# Patient Record
Sex: FEMALE | Race: BLACK OR AFRICAN AMERICAN | NOT HISPANIC OR LATINO | Employment: FULL TIME | URBAN - NONMETROPOLITAN AREA
[De-identification: names, ages, dates, MRNs, and addresses within clinical notes are randomized per-mention and may not be internally consistent; named-entity substitution may affect disease eponyms.]

---

## 2018-03-27 ENCOUNTER — INITIAL PRENATAL (OUTPATIENT)
Dept: OBSTETRICS AND GYNECOLOGY | Facility: CLINIC | Age: 33
End: 2018-03-27

## 2018-03-27 ENCOUNTER — APPOINTMENT (OUTPATIENT)
Dept: LAB | Facility: HOSPITAL | Age: 33
End: 2018-03-27

## 2018-03-27 VITALS
SYSTOLIC BLOOD PRESSURE: 119 MMHG | DIASTOLIC BLOOD PRESSURE: 68 MMHG | BODY MASS INDEX: 33.68 KG/M2 | WEIGHT: 183 LBS | HEIGHT: 62 IN

## 2018-03-27 DIAGNOSIS — O99.210 OBESITY IN PREGNANCY: ICD-10-CM

## 2018-03-27 DIAGNOSIS — O34.211 MATERNAL CARE DUE TO LOW TRANSVERSE UTERINE SCAR FROM PREVIOUS CESAREAN DELIVERY: ICD-10-CM

## 2018-03-27 DIAGNOSIS — O99.011 ANEMIA DURING PREGNANCY IN FIRST TRIMESTER: ICD-10-CM

## 2018-03-27 DIAGNOSIS — Z32.01 PREGNANCY EXAMINATION OR TEST, POSITIVE RESULT: ICD-10-CM

## 2018-03-27 DIAGNOSIS — Z3A.09 9 WEEKS GESTATION OF PREGNANCY: Primary | ICD-10-CM

## 2018-03-27 PROBLEM — E66.9 OBESITY (BMI 30.0-34.9): Chronic | Status: ACTIVE | Noted: 2018-03-27

## 2018-03-27 LAB
ABO GROUP BLD: NORMAL
AMPHET+METHAMPHET UR QL: NEGATIVE
BARBITURATES UR QL SCN: NEGATIVE
BASOPHILS # BLD AUTO: 0.03 10*3/MM3 (ref 0–0.2)
BASOPHILS NFR BLD AUTO: 0.3 % (ref 0–2)
BENZODIAZ UR QL SCN: NEGATIVE
BILIRUB UR QL STRIP: NEGATIVE
BLD GP AB SCN SERPL QL: NEGATIVE
CANNABINOIDS SERPL QL: NEGATIVE
CLARITY UR: CLEAR
COCAINE UR QL: NEGATIVE
COLOR UR: YELLOW
DEPRECATED RDW RBC AUTO: 41.8 FL (ref 36.4–46.3)
EOSINOPHIL # BLD AUTO: 0.1 10*3/MM3 (ref 0–0.7)
EOSINOPHIL NFR BLD AUTO: 1.1 % (ref 0–7)
ERYTHROCYTE [DISTWIDTH] IN BLOOD BY AUTOMATED COUNT: 15.8 % (ref 11.5–14.5)
GLUCOSE UR STRIP-MCNC: NEGATIVE MG/DL
HCT VFR BLD AUTO: 31.9 % (ref 35–45)
HGB BLD-MCNC: 10.3 G/DL (ref 12–15.5)
HGB UR QL STRIP.AUTO: NEGATIVE
IMM GRANULOCYTES # BLD: 0.02 10*3/MM3 (ref 0–0.02)
IMM GRANULOCYTES NFR BLD: 0.2 % (ref 0–0.5)
KETONES UR QL STRIP: NEGATIVE
LEUKOCYTE ESTERASE UR QL STRIP.AUTO: NEGATIVE
LYMPHOCYTES # BLD AUTO: 2.82 10*3/MM3 (ref 0.6–4.2)
LYMPHOCYTES NFR BLD AUTO: 30.4 % (ref 10–50)
Lab: NORMAL
MCH RBC QN AUTO: 23.4 PG (ref 26.5–34)
MCHC RBC AUTO-ENTMCNC: 32.3 G/DL (ref 31.4–36)
MCV RBC AUTO: 72.3 FL (ref 80–98)
METHADONE UR QL SCN: NEGATIVE
MONOCYTES # BLD AUTO: 0.55 10*3/MM3 (ref 0–0.9)
MONOCYTES NFR BLD AUTO: 5.9 % (ref 0–12)
NEUTROPHILS # BLD AUTO: 5.77 10*3/MM3 (ref 2–8.6)
NEUTROPHILS NFR BLD AUTO: 62.1 % (ref 37–80)
NITRITE UR QL STRIP: NEGATIVE
NRBC BLD MANUAL-RTO: 0.3 /100 WBC (ref 0–0)
OPIATES UR QL: NEGATIVE
OXYCODONE UR QL SCN: NEGATIVE
PH UR STRIP.AUTO: 7 [PH] (ref 5–9)
PLATELET # BLD AUTO: 391 10*3/MM3 (ref 150–450)
PMV BLD AUTO: 11 FL (ref 8–12)
PROT UR QL STRIP: NEGATIVE
RBC # BLD AUTO: 4.41 10*6/MM3 (ref 3.77–5.16)
RH BLD: POSITIVE
SP GR UR STRIP: 1.02 (ref 1–1.03)
UROBILINOGEN UR QL STRIP: NORMAL
WBC NRBC COR # BLD: 9.29 10*3/MM3 (ref 3.2–9.8)

## 2018-03-27 PROCEDURE — 87086 URINE CULTURE/COLONY COUNT: CPT | Performed by: OBSTETRICS & GYNECOLOGY

## 2018-03-27 PROCEDURE — 81003 URINALYSIS AUTO W/O SCOPE: CPT | Performed by: OBSTETRICS & GYNECOLOGY

## 2018-03-27 PROCEDURE — 86901 BLOOD TYPING SEROLOGIC RH(D): CPT | Performed by: OBSTETRICS & GYNECOLOGY

## 2018-03-27 PROCEDURE — 86850 RBC ANTIBODY SCREEN: CPT | Performed by: OBSTETRICS & GYNECOLOGY

## 2018-03-27 PROCEDURE — G0432 EIA HIV-1/HIV-2 SCREEN: HCPCS | Performed by: OBSTETRICS & GYNECOLOGY

## 2018-03-27 PROCEDURE — 80307 DRUG TEST PRSMV CHEM ANLYZR: CPT | Performed by: OBSTETRICS & GYNECOLOGY

## 2018-03-27 PROCEDURE — 86762 RUBELLA ANTIBODY: CPT | Performed by: OBSTETRICS & GYNECOLOGY

## 2018-03-27 PROCEDURE — 36415 COLL VENOUS BLD VENIPUNCTURE: CPT | Performed by: OBSTETRICS & GYNECOLOGY

## 2018-03-27 PROCEDURE — 85025 COMPLETE CBC W/AUTO DIFF WBC: CPT | Performed by: OBSTETRICS & GYNECOLOGY

## 2018-03-27 PROCEDURE — 0501F PRENATAL FLOW SHEET: CPT | Performed by: OBSTETRICS & GYNECOLOGY

## 2018-03-27 PROCEDURE — 87340 HEPATITIS B SURFACE AG IA: CPT | Performed by: OBSTETRICS & GYNECOLOGY

## 2018-03-27 PROCEDURE — 86900 BLOOD TYPING SEROLOGIC ABO: CPT | Performed by: OBSTETRICS & GYNECOLOGY

## 2018-03-27 PROCEDURE — 86803 HEPATITIS C AB TEST: CPT | Performed by: OBSTETRICS & GYNECOLOGY

## 2018-03-27 RX ORDER — ONDANSETRON 8 MG/1
8 TABLET, ORALLY DISINTEGRATING ORAL DAILY
Qty: 30 TABLET | Refills: 11 | Status: SHIPPED | OUTPATIENT
Start: 2018-03-27 | End: 2018-04-30 | Stop reason: SDUPTHER

## 2018-03-27 RX ORDER — PROMETHAZINE HYDROCHLORIDE 25 MG/1
25 TABLET ORAL EVERY 6 HOURS PRN
Qty: 60 TABLET | Refills: 11 | Status: SHIPPED | OUTPATIENT
Start: 2018-03-27 | End: 2018-07-25

## 2018-03-27 RX ORDER — FOLIC ACID 1 MG/1
1 TABLET ORAL DAILY
Qty: 90 TABLET | Refills: 3 | Status: SHIPPED | OUTPATIENT
Start: 2018-03-27

## 2018-03-28 PROBLEM — O99.019 ANEMIA OF PREGNANCY: Status: ACTIVE | Noted: 2018-03-28

## 2018-03-28 LAB
BACTERIA SPEC AEROBE CULT: NORMAL
HBV SURFACE AG SERPL QL IA: NEGATIVE
HCV AB SER DONR QL: NEGATIVE
HIV1+2 AB SER QL: NEGATIVE
RUBV IGG SER QL: ABNORMAL
RUBV IGG SER-ACNC: 53.7 IU/ML (ref 0–9.9)

## 2018-03-28 RX ORDER — FERROUS SULFATE 325(65) MG
325 TABLET ORAL
Qty: 90 TABLET | Refills: 11 | Status: SHIPPED | OUTPATIENT
Start: 2018-03-28 | End: 2018-04-30 | Stop reason: SDUPTHER

## 2018-03-28 NOTE — PROGRESS NOTES
Chief Complaint   Patient presents with   • High Risk Gestation       Velia Hernández is a 32 y.o. year old .  Patient's last menstrual period was 2018.  She presents to be seen to initiate prenatal care.  She has had 3 previous  sections.  The first in  named Jevon.   named Dorothy.  2010 named Bethanie.  Dr. Haywood delivered the last baby.    2017, she is having nausea and vomiting on prenatal vitamins, and I placed her on folic acid, Phenergan, and Zofran.  Handheld ultrasound confirms single intrauterine pregnancy with fetal heart rate 170 bpm.  After she left her prenatal labs came back showing hemoglobin 10.3 and hematocrit 31.9.  We will place her on iron.    She desires to have her tubes tied.  We'll plan for repeat  section and permanent surgical sterilization.    Smoking status: Not on file                        Smokeless tobacco: Not on file                         The following portions of the patient's history were reviewed and updated as appropriate:vital signs, allergies, current medications, past family history, past medical history, past social history, past surgical history and problem list.    Lab Review   CBC    Imaging   Handheld ultrasound confirming single intrauterine pregnancy consistent with 9 week gestation with fetal heart rate 170 bpm.    Assessment/Plan   ASSESSMENT  1. IUP at 9w1d  Velia was seen today for high risk gestation.    Diagnoses and all orders for this visit:    9 weeks gestation of pregnancy    Maternal care due to low transverse uterine scar from previous  delivery    Obesity in pregnancy    Pregnancy examination or test, positive result  -     OB Panel With HIV  -     Urinalysis - Urine, Clean Catch  -     Urine Culture - Urine, Urine, Clean Catch  -     Urine Drug Screen - Urine, Clean Catch  -     US Ob Transvaginal  -     RPR  -     CBC Auto Differential  -     Hepatitis B Surface Antigen  -      Rubella Antibody, IgG  -     OB Panel Type & Screen  -     HIV-1 & HIV-2 Antibodies  -     Hepatitis C Antibody  -     PREVIOUS HISTORY; Future  -     PREVIOUS HISTORY  -     Scan Slide; Future  -     Cancel: Scan Slide    Anemia during pregnancy in first trimester    Other orders  -     folic acid (FOLVITE) 1 MG tablet; Take 1 tablet by mouth Daily.  -     promethazine (PHENERGAN) 25 MG tablet; Take 1 tablet by mouth Every 6 (Six) Hours As Needed for Nausea or Vomiting.  -     ondansetron ODT (ZOFRAN ODT) 8 MG disintegrating tablet; Take 1 tablet by mouth Daily.  -     ferrous sulfate 325 (65 FE) MG tablet; Take 1 tablet by mouth 3 (Three) Times a Day With Meals.    2.     PLAN  1. Tests ordered today:  Orders Placed This Encounter   Procedures   • Urine Culture - Urine, Urine, Clean Catch   • US Ob Transvaginal     Order Specific Question:   Reason for Exam:     Answer:   dating   • OB Panel With HIV   • Urinalysis - Urine, Clean Catch   • Urine Drug Screen - Urine, Clean Catch   • RPR   • CBC Auto Differential   • Hepatitis B Surface Antigen   • Rubella Antibody, IgG   • HIV-1 & HIV-2 Antibodies   • Hepatitis C Antibody   • Scan Slide     Standing Status:   Future     Number of Occurrences:   1     Standing Expiration Date:   3/27/2019   • OB Panel Type & Screen   • PREVIOUS HISTORY     Standing Status:   Future     Number of Occurrences:   1     Standing Expiration Date:   3/27/2019     2. Medications prescribed today:  New Medications Ordered This Visit   Medications   • folic acid (FOLVITE) 1 MG tablet     Sig: Take 1 tablet by mouth Daily.     Dispense:  90 tablet     Refill:  3   • promethazine (PHENERGAN) 25 MG tablet     Sig: Take 1 tablet by mouth Every 6 (Six) Hours As Needed for Nausea or Vomiting.     Dispense:  60 tablet     Refill:  11   • ondansetron ODT (ZOFRAN ODT) 8 MG disintegrating tablet     Sig: Take 1 tablet by mouth Daily.     Dispense:  30 tablet     Refill:  11   • ferrous sulfate 325 (65  FE) MG tablet     Sig: Take 1 tablet by mouth 3 (Three) Times a Day With Meals.     Dispense:  90 tablet     Refill:  11       Follow up: 4 week(s)       This note was electronically signed.    Aroldo Haywood MD  March 28, 2018

## 2018-03-30 LAB — RPR SER QL: NORMAL

## 2018-04-12 ENCOUNTER — TELEPHONE (OUTPATIENT)
Dept: OBSTETRICS AND GYNECOLOGY | Facility: CLINIC | Age: 33
End: 2018-04-12

## 2018-04-12 NOTE — TELEPHONE ENCOUNTER
----- Message from Danica Holliday sent at 4/12/2018 11:47 AM CDT -----  Contact: [phone]  Pt was seen in [facility] last night for bleeding, wants to follow up with Dr. Haywood next week in [place] in the morning. She said the heart rate was fine and she is no longer bleeding. She was seen on 03/27/2018 for a new ob with Dr. Haywood. Please call patient, thanks!

## 2018-04-30 ENCOUNTER — ROUTINE PRENATAL (OUTPATIENT)
Dept: OBSTETRICS AND GYNECOLOGY | Facility: CLINIC | Age: 33
End: 2018-04-30

## 2018-04-30 VITALS — WEIGHT: 180 LBS | SYSTOLIC BLOOD PRESSURE: 111 MMHG | DIASTOLIC BLOOD PRESSURE: 72 MMHG | BODY MASS INDEX: 32.92 KG/M2

## 2018-04-30 DIAGNOSIS — O99.011 ANEMIA DURING PREGNANCY IN FIRST TRIMESTER: ICD-10-CM

## 2018-04-30 DIAGNOSIS — Z3A.13 13 WEEKS GESTATION OF PREGNANCY: ICD-10-CM

## 2018-04-30 DIAGNOSIS — O99.611 CONSTIPATION DURING PREGNANCY IN FIRST TRIMESTER: ICD-10-CM

## 2018-04-30 DIAGNOSIS — O34.211 ENCOUNTER FOR MATERNAL CARE FOR LOW TRANSVERSE SCAR FROM PREVIOUS CESAREAN DELIVERY: Primary | ICD-10-CM

## 2018-04-30 DIAGNOSIS — Z36.3 ENCOUNTER FOR ANTENATAL SCREENING FOR MALFORMATION USING ULTRASOUND: ICD-10-CM

## 2018-04-30 DIAGNOSIS — K59.00 CONSTIPATION DURING PREGNANCY IN FIRST TRIMESTER: ICD-10-CM

## 2018-04-30 DIAGNOSIS — O99.210 OBESITY IN PREGNANCY: ICD-10-CM

## 2018-04-30 DIAGNOSIS — O21.9 NAUSEA AND VOMITING DURING PREGNANCY PRIOR TO 22 WEEKS GESTATION: ICD-10-CM

## 2018-04-30 PROCEDURE — 0502F SUBSEQUENT PRENATAL CARE: CPT | Performed by: NURSE PRACTITIONER

## 2018-04-30 RX ORDER — ONDANSETRON 8 MG/1
8 TABLET, ORALLY DISINTEGRATING ORAL DAILY
Qty: 30 TABLET | Refills: 11 | Status: SHIPPED | OUTPATIENT
Start: 2018-04-30 | End: 2018-07-25

## 2018-04-30 RX ORDER — FERROUS SULFATE 325(65) MG
325 TABLET ORAL
Qty: 90 TABLET | Refills: 11 | Status: SHIPPED | OUTPATIENT
Start: 2018-04-30 | End: 2018-08-08

## 2018-04-30 RX ORDER — RANITIDINE 150 MG/1
150 TABLET ORAL NIGHTLY
Qty: 30 TABLET | Refills: 6 | Status: SHIPPED | OUTPATIENT
Start: 2018-04-30 | End: 2018-07-25

## 2018-04-30 RX ORDER — DOCUSATE SODIUM 100 MG/1
100 CAPSULE, LIQUID FILLED ORAL NIGHTLY
Qty: 30 CAPSULE | Refills: 12 | Status: SHIPPED | OUTPATIENT
Start: 2018-04-30 | End: 2019-04-30

## 2018-04-30 NOTE — PROGRESS NOTES
Velia Hernández is a 32 y.o. year old .  Patient's last menstrual period was 2018.  She presents to be seen to initiate prenatal care.  She has had 3 previous  sections.  The first in  named Jevon.   named Dorothy.  2010 named Bethanie.  Dr. Haywood delivered the last baby.     2017, she is having nausea and vomiting on prenatal vitamins, and I placed her on folic acid, Phenergan, and Zofran.  Handheld ultrasound confirms single intrauterine pregnancy with fetal heart rate 170 bpm.  After she left her prenatal labs came back showing hemoglobin 10.3 and hematocrit 31.9.  We will place her on iron.    2017, she continues to have nausea and vomiting and is requesting intermittent FMLA because she is getting points at work for having to leave 2/2 sickness. Taking phenergan at night but nothing during the daytime. Will start on zofran during the day and she will bring back her FMLA papers. Having constipation with the iron supplement and c/o a metallic taste in her mouth, especially first thing in the morning. Will send colace HS and zantac HS. Vscan showed + fetal heart rate and fetal movement. Anatomy scan ordered and scheduled for 6 weeks, LICO visit in 4 weeks.     She desires to have her tubes tied.  We'll plan for repeat  section and permanent surgical sterilization.     Smoking status: Not on file                        Smokeless tobacco: Not on file                           The following portions of the patient's history were reviewed and updated as appropriate:vital signs, allergies, current medications, past family history, past medical history, past social history, past surgical history and problem list.    Velia was seen today for routine prenatal visit.    Diagnoses and all orders for this visit:    Encounter for maternal care for low transverse scar from previous  delivery  -     US Ob 14 + Weeks Single or First Gestation;  Future    Encounter for  screening for malformation using ultrasound  -     US Ob 14 + Weeks Single or First Gestation; Future    Nausea and vomiting during pregnancy prior to 22 weeks gestation  -     US Ob 14 + Weeks Single or First Gestation; Future    13 weeks gestation of pregnancy    Constipation during pregnancy in first trimester    Anemia during pregnancy in first trimester    Obesity in pregnancy    Other orders  -     ondansetron ODT (ZOFRAN ODT) 8 MG disintegrating tablet; Take 1 tablet by mouth Daily.  -     ferrous sulfate 325 (65 FE) MG tablet; Take 1 tablet by mouth 3 (Three) Times a Day With Meals.  -     docusate sodium (COLACE) 100 MG capsule; Take 1 capsule by mouth Every Night.  -     raNITIdine (ZANTAC) 150 MG tablet; Take 1 tablet by mouth Every Night.

## 2018-05-14 ENCOUNTER — ROUTINE PRENATAL (OUTPATIENT)
Dept: OBSTETRICS AND GYNECOLOGY | Facility: CLINIC | Age: 33
End: 2018-05-14

## 2018-05-14 VITALS — BODY MASS INDEX: 33.29 KG/M2 | SYSTOLIC BLOOD PRESSURE: 122 MMHG | WEIGHT: 182 LBS | DIASTOLIC BLOOD PRESSURE: 73 MMHG

## 2018-05-14 DIAGNOSIS — O34.211 MATERNAL CARE DUE TO LOW TRANSVERSE UTERINE SCAR FROM PREVIOUS CESAREAN DELIVERY: ICD-10-CM

## 2018-05-14 DIAGNOSIS — Z3A.19 19 WEEKS GESTATION OF PREGNANCY: ICD-10-CM

## 2018-05-14 DIAGNOSIS — O99.012 ANEMIA DURING PREGNANCY IN SECOND TRIMESTER: ICD-10-CM

## 2018-05-14 DIAGNOSIS — O99.210 OBESITY IN PREGNANCY: ICD-10-CM

## 2018-05-14 DIAGNOSIS — O21.9 NAUSEA AND VOMITING DURING PREGNANCY PRIOR TO 22 WEEKS GESTATION: Primary | ICD-10-CM

## 2018-05-14 PROBLEM — O99.612 CONSTIPATION DURING PREGNANCY IN SECOND TRIMESTER: Status: ACTIVE | Noted: 2018-04-30

## 2018-05-14 PROCEDURE — 0502F SUBSEQUENT PRENATAL CARE: CPT | Performed by: NURSE PRACTITIONER

## 2018-05-14 NOTE — PROGRESS NOTES
Velia Hernández is a 32 y.o. year old .  Patient's last menstrual period was 2018.  She presents to be seen to initiate prenatal care.  She has had 3 previous  sections.  The first in  named Jevon.   named Dorothy.  2010 named Bethanie.  Dr. Haywood delivered the last baby.     2017, she is having nausea and vomiting on prenatal vitamins, and I placed her on folic acid, Phenergan, and Zofran.  Handheld ultrasound confirms single intrauterine pregnancy with fetal heart rate 170 bpm.  After she left her prenatal labs came back showing hemoglobin 10.3 and hematocrit 31.9.  We will place her on iron.     2017, she continues to have nausea and vomiting and is requesting intermittent FMLA because she is getting points at work for having to leave 2/2 sickness. Taking phenergan at night but nothing during the daytime. Will start on zofran during the day and she will bring back her FMLA papers. Having constipation with the iron supplement and c/o a metallic taste in her mouth, especially first thing in the morning. Will send colace HS and zantac HS. Vscan showed + fetal heart rate and fetal movement. Anatomy scan ordered and scheduled for 6 weeks, LICO visit in 4 weeks.     She desires to have her tubes tied.  We'll plan for repeat  section and permanent surgical sterilization.    May 14, 2018 Here for check-up. States that she was kneed in the belly 8 days ago and has not been seen to make sure the baby is ok. Had cramping last Monday but no bleeding or cramping since then. Morning sickness has improved greatly since starting on Zofran. States that she is feeling daily movements up by her belly button, which is why she got worried after being kneed, because she thinks the baby is higher than where it should be for 15 weeks and thinks that the baby was actually hit. Vscan showed an active fetus that appears to be around 20 weeks in size. She reports that she  "had a \"normal period\" on 18, then had 2 days of light spotting on 18. Changed active LMP to 18 which makes JEVON 10/9/18 and she is 19w0d. Will confirm with anatomy scan this Friday.    Chief Complaint   Patient presents with   • Other     Hit in belly last ; hasn't been seen       The patient complains of the following: No complaints    ROS  Vaginal bleeding: No   Nausea: improved as compared to the prior visit   Diarrhea: No   Constipation: improved as compared to the prior visit   Other:      Lab Results   Component Value Date    ABO B 2018    RH Positive 2018    ABSCRN Negative 2018       Specific topics discussed at today's visit:none - she had no major complaints,questions or concerns  Tests done today: none  Tests to be done at the next visit: U/S for anatomic screening    Velia was seen today for other.    Diagnoses and all orders for this visit:    Nausea and vomiting during pregnancy prior to 22 weeks gestation    Anemia during pregnancy in second trimester    Maternal care due to low transverse uterine scar from previous  delivery    Obesity in pregnancy    19 weeks gestation of pregnancy            "

## 2018-05-18 ENCOUNTER — ROUTINE PRENATAL (OUTPATIENT)
Dept: OBSTETRICS AND GYNECOLOGY | Facility: CLINIC | Age: 33
End: 2018-05-18

## 2018-05-18 VITALS — WEIGHT: 181 LBS | DIASTOLIC BLOOD PRESSURE: 67 MMHG | BODY MASS INDEX: 33.11 KG/M2 | SYSTOLIC BLOOD PRESSURE: 114 MMHG

## 2018-05-18 DIAGNOSIS — Z36.3 ENCOUNTER FOR ANTENATAL SCREENING FOR MALFORMATION USING ULTRASOUND: ICD-10-CM

## 2018-05-18 DIAGNOSIS — O21.9 NAUSEA AND VOMITING DURING PREGNANCY PRIOR TO 22 WEEKS GESTATION: ICD-10-CM

## 2018-05-18 DIAGNOSIS — Z3A.17 17 WEEKS GESTATION OF PREGNANCY: ICD-10-CM

## 2018-05-18 DIAGNOSIS — IMO0002 EVALUATE ANATOMY NOT SEEN ON PRIOR SONOGRAM: ICD-10-CM

## 2018-05-18 DIAGNOSIS — Z36.89 ENCOUNTER FOR OTHER SPECIFIED ANTENATAL SCREENING: Primary | ICD-10-CM

## 2018-05-18 DIAGNOSIS — O99.210 OBESITY IN PREGNANCY: ICD-10-CM

## 2018-05-18 DIAGNOSIS — O34.211 MATERNAL CARE DUE TO LOW TRANSVERSE UTERINE SCAR FROM PREVIOUS CESAREAN DELIVERY: ICD-10-CM

## 2018-05-18 PROCEDURE — 0502F SUBSEQUENT PRENATAL CARE: CPT | Performed by: NURSE PRACTITIONER

## 2018-05-18 NOTE — PROGRESS NOTES
Velia Hernández is a 32 y.o. year old .  Patient's last menstrual period was 2018.  She presents to be seen to initiate prenatal care.  She has had 3 previous  sections.  The first in  named Jevon.   named Dorothy.  2010 named Bethanie.  Dr. Haywood delivered the last baby.     2017, she is having nausea and vomiting on prenatal vitamins, and I placed her on folic acid, Phenergan, and Zofran.  Handheld ultrasound confirms single intrauterine pregnancy with fetal heart rate 170 bpm.  After she left her prenatal labs came back showing hemoglobin 10.3 and hematocrit 31.9.  We will place her on iron.     2017, she continues to have nausea and vomiting and is requesting intermittent FMLA because she is getting points at work for having to leave 2/2 sickness. Taking phenergan at night but nothing during the daytime. Will start on zofran during the day and she will bring back her FMLA papers. Having constipation with the iron supplement and c/o a metallic taste in her mouth, especially first thing in the morning. Will send colace HS and zantac HS. Vscan showed + fetal heart rate and fetal movement. Anatomy scan ordered and scheduled for 6 weeks, LICO visit in 4 weeks.     She desires to have her tubes tied.  We'll plan for repeat  section and permanent surgical sterilization.     May 14, 2018 Here for check-up. States that she was kneed in the belly 8 days ago and has not been seen to make sure the baby is ok. Had cramping last Monday but no bleeding or cramping since then. Morning sickness has improved greatly since starting on Zofran. States that she is feeling daily movements up by her belly button, which is why she got worried after being kneed, because she thinks the baby is higher than where it should be for 15 weeks and thinks that the baby was actually hit. Vscan showed an active fetus that appears to be around 20 weeks in size. She reports that she  "had a \"normal period\" on 1/1/18, then had 2 days of light spotting on 1/23/18. Changed active LMP to 1/1/18 which makes JEVON 10/9/18 and she is 19w0d. Will confirm with anatomy scan this Friday.    5/18/18- Here for anatomy u/s and to confirm dates. She is measuring 21cm and feeling fetal movement at the umbilicus. Reports that her morning sickness is well controlled at this time and she denies dysuria, vb, LOF, headaches or heartburn. Anatomy u/s has baby measuring 17w0d with EFW of 0lb 6oz. LOIS- 13.0cm, CL- 3.58cm and posterior placenta with normal insertion of a 3 vessel cord. All fetal structures seen and noted to appear normal, except for the RVOT and LVOT; both were not adequately seen today. Recommended f/u scan in 5 weeks to complete anatomy. Baby boy, not sure of name yet.  "

## 2018-06-22 ENCOUNTER — ROUTINE PRENATAL (OUTPATIENT)
Dept: OBSTETRICS AND GYNECOLOGY | Facility: CLINIC | Age: 33
End: 2018-06-22

## 2018-06-22 VITALS — WEIGHT: 179 LBS | SYSTOLIC BLOOD PRESSURE: 114 MMHG | BODY MASS INDEX: 32.74 KG/M2 | DIASTOLIC BLOOD PRESSURE: 73 MMHG

## 2018-06-22 DIAGNOSIS — O99.012 ANEMIA DURING PREGNANCY IN SECOND TRIMESTER: Primary | ICD-10-CM

## 2018-06-22 DIAGNOSIS — O34.211 MATERNAL CARE DUE TO LOW TRANSVERSE UTERINE SCAR FROM PREVIOUS CESAREAN DELIVERY: ICD-10-CM

## 2018-06-22 DIAGNOSIS — Z3A.22 22 WEEKS GESTATION OF PREGNANCY: ICD-10-CM

## 2018-06-22 DIAGNOSIS — O99.210 OBESITY IN PREGNANCY: ICD-10-CM

## 2018-06-22 DIAGNOSIS — Z36.89 ENCOUNTER FOR OTHER SPECIFIED ANTENATAL SCREENING: ICD-10-CM

## 2018-06-22 PROCEDURE — 0502F SUBSEQUENT PRENATAL CARE: CPT | Performed by: NURSE PRACTITIONER

## 2018-06-22 NOTE — PROGRESS NOTES
Velia Hernández is a 32 y.o. year old .  Patient's last menstrual period was 2018.  She presents to be seen to initiate prenatal care.  She has had 3 previous  sections.  The first in  named Jevon.   named Dorothy.  2010 named Bethanie.  Dr. Haywood delivered the last baby.     2017, she is having nausea and vomiting on prenatal vitamins, and I placed her on folic acid, Phenergan, and Zofran.  Handheld ultrasound confirms single intrauterine pregnancy with fetal heart rate 170 bpm.  After she left her prenatal labs came back showing hemoglobin 10.3 and hematocrit 31.9.  We will place her on iron.     2017, she continues to have nausea and vomiting and is requesting intermittent FMLA because she is getting points at work for having to leave 2/2 sickness. Taking phenergan at night but nothing during the daytime. Will start on zofran during the day and she will bring back her FMLA papers. Having constipation with the iron supplement and c/o a metallic taste in her mouth, especially first thing in the morning. Will send colace HS and zantac HS. Vscan showed + fetal heart rate and fetal movement. Anatomy scan ordered and scheduled for 6 weeks, LICO visit in 4 weeks.     She desires to have her tubes tied.  We'll plan for repeat  section and permanent surgical sterilization.     May 14, 2018 Here for check-up. States that she was kneed in the belly 8 days ago and has not been seen to make sure the baby is ok. Had cramping last Monday but no bleeding or cramping since then. Morning sickness has improved greatly since starting on Zofran. States that she is feeling daily movements up by her belly button, which is why she got worried after being kneed, because she thinks the baby is higher than where it should be for 15 weeks and thinks that the baby was actually hit. Vscan showed an active fetus that appears to be around 20 weeks in size. She reports that she  "had a \"normal period\" on 1/1/18, then had 2 days of light spotting on 1/23/18. Changed active LMP to 1/1/18 which makes JEVON 10/9/18 and she is 19w0d. Will confirm with anatomy scan this Friday.     5/18/18- Here for anatomy u/s and to confirm dates. She is measuring 21cm and feeling fetal movement at the umbilicus. Reports that her morning sickness is well controlled at this time and she denies dysuria, vb, LOF, headaches or heartburn. Anatomy u/s has baby measuring 17w0d with EFW of 0lb 6oz. LOIS- 13.0cm, CL- 3.58cm and posterior placenta with normal insertion of a 3 vessel cord. All fetal structures seen and noted to appear normal, except for the RVOT and LVOT; both were not adequately seen today. Recommended f/u scan in 5 weeks to complete anatomy. Baby boy, not sure of name yet.    6/22/18- F/U u/s on heart views today; all WNL and no other imaging recommended on preliminary report. Feeling regular movements and denies dysuria, vb, LOF headaches or heartburn. Having cramping after long shifts but subsides with rest. Naming baby boy Perez Scott RTC in 4 weeks for 1 hour glucola and CBC.   "

## 2018-07-25 ENCOUNTER — ROUTINE PRENATAL (OUTPATIENT)
Dept: OBSTETRICS AND GYNECOLOGY | Facility: CLINIC | Age: 33
End: 2018-07-25

## 2018-07-25 VITALS — SYSTOLIC BLOOD PRESSURE: 107 MMHG | DIASTOLIC BLOOD PRESSURE: 67 MMHG | WEIGHT: 181 LBS | BODY MASS INDEX: 33.11 KG/M2

## 2018-07-25 DIAGNOSIS — Z3A.26 26 WEEKS GESTATION OF PREGNANCY: Primary | ICD-10-CM

## 2018-07-25 DIAGNOSIS — Z34.80 PRENATAL CARE OF MULTIGRAVIDA, ANTEPARTUM: ICD-10-CM

## 2018-07-25 DIAGNOSIS — O34.211 MATERNAL CARE DUE TO LOW TRANSVERSE UTERINE SCAR FROM PREVIOUS CESAREAN DELIVERY: ICD-10-CM

## 2018-07-25 DIAGNOSIS — O99.210 OBESITY IN PREGNANCY: ICD-10-CM

## 2018-07-25 DIAGNOSIS — O99.012 ANEMIA DURING PREGNANCY IN SECOND TRIMESTER: ICD-10-CM

## 2018-07-25 PROCEDURE — 82950 GLUCOSE TEST: CPT | Performed by: NURSE PRACTITIONER

## 2018-07-25 PROCEDURE — 85027 COMPLETE CBC AUTOMATED: CPT | Performed by: NURSE PRACTITIONER

## 2018-07-25 PROCEDURE — 0502F SUBSEQUENT PRENATAL CARE: CPT | Performed by: OBSTETRICS & GYNECOLOGY

## 2018-07-28 PROBLEM — O47.02 THREATENED PREMATURE LABOR IN SECOND TRIMESTER: Status: ACTIVE | Noted: 2018-07-28

## 2018-07-28 NOTE — PROGRESS NOTES
Chief Complaint   Patient presents with   • Routine Prenatal Visit   • High Risk Gestation     Velia Hernández is a 32 y.o. year old .  Patient's last menstrual period was 2018.  She presents to be seen to initiate prenatal care.  She has had 3 previous  sections.  The first in  named Jevon.   named Dorothy.  2010 named Bethanie.  Dr. Haywood delivered the last baby.     2017, she is having nausea and vomiting on prenatal vitamins, and I placed her on folic acid, Phenergan, and Zofran.  Handheld ultrasound confirms single intrauterine pregnancy with fetal heart rate 170 bpm.  After she left her prenatal labs came back showing hemoglobin 10.3 and hematocrit 31.9.  We will place her on iron.     2017, she continues to have nausea and vomiting and is requesting intermittent FMLA because she is getting points at work for having to leave 2/2 sickness. Taking phenergan at night but nothing during the daytime. Will start on zofran during the day and she will bring back her FMLA papers. Having constipation with the iron supplement and c/o a metallic taste in her mouth, especially first thing in the morning. Will send colace HS and zantac HS. Vscan showed + fetal heart rate and fetal movement. Anatomy scan ordered and scheduled for 6 weeks, LICO visit in 4 weeks.     She desires to have her tubes tied.  We'll plan for repeat  section and permanent surgical sterilization.     May 14, 2018 Here for check-up. States that she was kneed in the belly 8 days ago and has not been seen to make sure the baby is ok. Had cramping last Monday but no bleeding or cramping since then. Morning sickness has improved greatly since starting on Zofran. States that she is feeling daily movements up by her belly button, which is why she got worried after being kneed, because she thinks the baby is higher than where it should be for 15 weeks and thinks that the baby was actually  "hit. Vscan showed an active fetus that appears to be around 20 weeks in size. She reports that she had a \"normal period\" on 18, then had 2 days of light spotting on 18. Changed active LMP to 18 which makes JEVON 10/9/18 and she is 19w0d. Will confirm with anatomy scan this Friday.     18- Here for anatomy u/s and to confirm dates. She is measuring 21cm and feeling fetal movement at the umbilicus. Reports that her morning sickness is well controlled at this time and she denies dysuria, vb, LOF, headaches or heartburn. Anatomy u/s has baby measuring 17w0d with EFW of 0lb 6oz. LOIS- 13.0cm, CL- 3.58cm and posterior placenta with normal insertion of a 3 vessel cord. All fetal structures seen and noted to appear normal, except for the RVOT and LVOT; both were not adequately seen today. Recommended f/u scan in 5 weeks to complete anatomy. Baby boy, not sure of name yet.     18- F/U u/s on heart views today; all WNL and no other imaging recommended on preliminary report. Feeling regular movements and denies dysuria, vb, LOF headaches or heartburn. Having cramping after long shifts but subsides with rest.    Naming baby boy Perez Scott     CBC and one-hour Glucola today.    She has threatened  labor, and I recommend she be off work for remainder of pregnancy starting 2018.    Plan repeat low transverse  section and bilateral tubal ligation on Tuesday, 2018.  She may want Mirena IUD.    The patient complains of the following: Worsening contractions while up and about.    ROS  Vaginal bleeding: No   Nausea: No   Diarrhea: No   Constipation: No   Other:      Lab Results   Component Value Date    ABO B 2018    RH Positive 2018    ABSCRN Negative 2018       Specific topics discussed at today's visit: Fetal kick counts and  labor precautions  Tests done today: CBC and one-hour Glucola   Tests to be done at the next visit: none    Velia was seen today " for routine prenatal visit and high risk gestation.    Diagnoses and all orders for this visit:    26 weeks gestation of pregnancy    Maternal care due to low transverse uterine scar from previous  delivery    Obesity in pregnancy    Anemia during pregnancy in second trimester    Prenatal care of multigravida, antepartum

## 2018-08-01 ENCOUNTER — TELEPHONE (OUTPATIENT)
Dept: OBSTETRICS AND GYNECOLOGY | Facility: CLINIC | Age: 33
End: 2018-08-01

## 2018-08-01 DIAGNOSIS — O99.012 ANEMIA OF PREGNANCY IN SECOND TRIMESTER: Primary | ICD-10-CM

## 2018-08-01 NOTE — TELEPHONE ENCOUNTER
----- Message from JHONY Mckenna sent at 7/30/2018 10:19 AM CDT -----  Please get her set up for iron infusions at the [facility]. She's already taking FeSO4 TID. 1 hour glucola WNL.

## 2018-08-08 ENCOUNTER — ROUTINE PRENATAL (OUTPATIENT)
Dept: OBSTETRICS AND GYNECOLOGY | Facility: CLINIC | Age: 33
End: 2018-08-08

## 2018-08-08 VITALS — WEIGHT: 178 LBS | DIASTOLIC BLOOD PRESSURE: 60 MMHG | SYSTOLIC BLOOD PRESSURE: 106 MMHG | BODY MASS INDEX: 32.56 KG/M2

## 2018-08-08 DIAGNOSIS — O99.210 OBESITY IN PREGNANCY: ICD-10-CM

## 2018-08-08 DIAGNOSIS — Z3A.28 28 WEEKS GESTATION OF PREGNANCY: Primary | ICD-10-CM

## 2018-08-08 DIAGNOSIS — O99.013 ANEMIA DURING PREGNANCY IN THIRD TRIMESTER: ICD-10-CM

## 2018-08-08 DIAGNOSIS — O34.211 MATERNAL CARE DUE TO LOW TRANSVERSE UTERINE SCAR FROM PREVIOUS CESAREAN DELIVERY: ICD-10-CM

## 2018-08-08 PROCEDURE — 0502F SUBSEQUENT PRENATAL CARE: CPT | Performed by: OBSTETRICS & GYNECOLOGY

## 2018-08-08 RX ORDER — FERROUS SULFATE 325(65) MG
325 TABLET ORAL
Qty: 90 TABLET | Refills: 12 | Status: SHIPPED | OUTPATIENT
Start: 2018-08-08

## 2018-08-08 NOTE — PROGRESS NOTES
Chief Complaint   Patient presents with   • High Risk Gestation     Velia Hernández is a 32 y.o. year old .  Patient's last menstrual period was 2018.  She presents to be seen to initiate prenatal care.  She has had 3 previous  sections.  The first in  named Jevon.   named Dorothy.  2010 named Bethanie.  Dr. Haywood delivered the last baby.     2017, she is having nausea and vomiting on prenatal vitamins, and I placed her on folic acid, Phenergan, and Zofran.  Handheld ultrasound confirms single intrauterine pregnancy with fetal heart rate 170 bpm.  After she left her prenatal labs came back showing hemoglobin 10.3 and hematocrit 31.9.  We will place her on iron.     2017, she continues to have nausea and vomiting and is requesting intermittent FMLA because she is getting points at work for having to leave 2/2 sickness. Taking phenergan at night but nothing during the daytime. Will start on zofran during the day and she will bring back her FMLA papers. Having constipation with the iron supplement and c/o a metallic taste in her mouth, especially first thing in the morning. Will send colace HS and zantac HS. Vscan showed + fetal heart rate and fetal movement. Anatomy scan ordered and scheduled for 6 weeks, LICO visit in 4 weeks.     She desires to have her tubes tied.  We'll plan for repeat  section and permanent surgical sterilization.     May 14, 2018 Here for check-up. States that she was kneed in the belly 8 days ago and has not been seen to make sure the baby is ok. Had cramping last Monday but no bleeding or cramping since then. Morning sickness has improved greatly since starting on Zofran. States that she is feeling daily movements up by her belly button, which is why she got worried after being kneed, because she thinks the baby is higher than where it should be for 15 weeks and thinks that the baby was actually hit. Vscan showed an active  "fetus that appears to be around 20 weeks in size. She reports that she had a \"normal period\" on 18, then had 2 days of light spotting on 18. Changed active LMP to 18 which makes JEVON 10/9/18 and she is 19w0d. Will confirm with anatomy scan this Friday.     18- Here for anatomy u/s and to confirm dates. She is measuring 21cm and feeling fetal movement at the umbilicus. Reports that her morning sickness is well controlled at this time and she denies dysuria, vb, LOF, headaches or heartburn. Anatomy u/s has baby measuring 17w0d with EFW of 0lb 6oz. LOIS- 13.0cm, CL- 3.58cm and posterior placenta with normal insertion of a 3 vessel cord. All fetal structures seen and noted to appear normal, except for the RVOT and LVOT; both were not adequately seen today. Recommended f/u scan in 5 weeks to complete anatomy. Baby boy, not sure of name yet.     18- F/U u/s on heart views today; all WNL and no other imaging recommended on preliminary report. Feeling regular movements and denies dysuria, vb, LOF headaches or heartburn. Having cramping after long shifts but subsides with rest.     Naming baby boy Perez Scott      CBC and one-hour Glucola today.     She has threatened  labor, and I recommend she be off work for remainder of pregnancy starting 2018.     Plan repeat low transverse  section and bilateral tubal ligation on Tuesday, 2018.  She may want Mirena IUD.    2018 one-hour Glucola normal at 134.  Hemoglobin 8.9 and hematocrit 29.2 and platelet count 362,000.  MCV 73.9.  We'll schedule patient for IV iron infusion.  I encouraged her to take her oral iron.    The patient complains of the following: No complaints    ROS  Vaginal bleeding: No   Nausea: No   Diarrhea: No   Constipation: No   Other:      Lab Results   Component Value Date    ABO B 2018    RH Positive 2018    ABSCRN Negative 2018       Specific topics discussed at today's visit: " Anemia  Tests done today: none  Tests to be done at the next visit: none    Velia was seen today for high risk gestation.    Diagnoses and all orders for this visit:    28 weeks gestation of pregnancy    Maternal care due to low transverse uterine scar from previous  delivery    Obesity in pregnancy    Anemia during pregnancy in third trimester    Other orders  -     ferrous sulfate 325 (65 FE) MG tablet; Take 1 tablet by mouth 3 (Three) Times a Day With Meals.

## 2018-08-16 RX ORDER — SODIUM CHLORIDE 9 MG/ML
250 INJECTION, SOLUTION INTRAVENOUS ONCE
OUTPATIENT
Start: 2018-08-16

## 2018-08-20 ENCOUNTER — ROUTINE PRENATAL (OUTPATIENT)
Dept: OBSTETRICS AND GYNECOLOGY | Facility: CLINIC | Age: 33
End: 2018-08-20

## 2018-08-20 VITALS — SYSTOLIC BLOOD PRESSURE: 99 MMHG | WEIGHT: 177 LBS | BODY MASS INDEX: 32.37 KG/M2 | DIASTOLIC BLOOD PRESSURE: 60 MMHG

## 2018-08-20 DIAGNOSIS — O99.013 ANEMIA DURING PREGNANCY IN THIRD TRIMESTER: Primary | ICD-10-CM

## 2018-08-20 PROCEDURE — 0502F SUBSEQUENT PRENATAL CARE: CPT | Performed by: NURSE PRACTITIONER

## 2018-08-20 NOTE — PROGRESS NOTES
Velia Hernández is a 32 y.o. year old .  Patient's last menstrual period was 2018.  She presents to be seen to initiate prenatal care.  She has had 3 previous  sections.  The first in  named Jevon.   named Dorothy.  2010 named Bethanie.  Dr. Haywood delivered the last baby.     2017, she is having nausea and vomiting on prenatal vitamins, and I placed her on folic acid, Phenergan, and Zofran.  Handheld ultrasound confirms single intrauterine pregnancy with fetal heart rate 170 bpm.  After she left her prenatal labs came back showing hemoglobin 10.3 and hematocrit 31.9.  We will place her on iron.     2017, she continues to have nausea and vomiting and is requesting intermittent FMLA because she is getting points at work for having to leave 2/2 sickness. Taking phenergan at night but nothing during the daytime. Will start on zofran during the day and she will bring back her FMLA papers. Having constipation with the iron supplement and c/o a metallic taste in her mouth, especially first thing in the morning. Will send colace HS and zantac HS. Vscan showed + fetal heart rate and fetal movement. Anatomy scan ordered and scheduled for 6 weeks, LICO visit in 4 weeks.     She desires to have her tubes tied.  We'll plan for repeat  section and permanent surgical sterilization.     May 14, 2018 Here for check-up. States that she was kneed in the belly 8 days ago and has not been seen to make sure the baby is ok. Had cramping last Monday but no bleeding or cramping since then. Morning sickness has improved greatly since starting on Zofran. States that she is feeling daily movements up by her belly button, which is why she got worried after being kneed, because she thinks the baby is higher than where it should be for 15 weeks and thinks that the baby was actually hit. Vscan showed an active fetus that appears to be around 20 weeks in size. She reports that she  "had a \"normal period\" on 18, then had 2 days of light spotting on 18. Changed active LMP to 18 which makes JEVON 10/9/18 and she is 19w0d. Will confirm with anatomy scan this Friday.     18- Here for anatomy u/s and to confirm dates. She is measuring 21cm and feeling fetal movement at the umbilicus. Reports that her morning sickness is well controlled at this time and she denies dysuria, vb, LOF, headaches or heartburn. Anatomy u/s has baby measuring 17w0d with EFW of 0lb 6oz. LOIS- 13.0cm, CL- 3.58cm and posterior placenta with normal insertion of a 3 vessel cord. All fetal structures seen and noted to appear normal, except for the RVOT and LVOT; both were not adequately seen today. Recommended f/u scan in 5 weeks to complete anatomy. Baby boy, not sure of name yet.     18- F/U u/s on heart views today; all WNL and no other imaging recommended on preliminary report. Feeling regular movements and denies dysuria, vb, LOF headaches or heartburn. Having cramping after long shifts but subsides with rest.     Naming baby boy Perez Scott      CBC and one-hour Glucola today.     She has threatened  labor, and I recommend she be off work for remainder of pregnancy starting 2018.     Plan repeat low transverse  section and bilateral tubal ligation on Tuesday, 2018.  She may want Mirena IUD.     2018 one-hour Glucola normal at 134.  Hemoglobin 8.9 and hematocrit 29.2 and platelet count 362,000.  MCV 73.9.  We'll schedule patient for IV iron infusion.  I encouraged her to take her oral iron.    2018- Feeling fatigued and nauseated today. Intermittently feeling heart racing and lightheaded but not frequent or constant. Denies dysuria, vb, LOF, decreased FM or regular contractions. Has been off work since  2/2  labor. Has not scheduled iron infusions in [place] due to confusion about frequency and length of visits. Would prefer to have " them done at [facility] if possible so she can get her kids to and from school. Will call [facility] to set up appointment. (Initial appt made with Dr. Curiel for 9/4 at 0900). S/S of PTL discussed. F/U in 2 weeks for LICO visit. Continue oral iron for remainder of pregnancy.

## 2018-09-05 ENCOUNTER — ROUTINE PRENATAL (OUTPATIENT)
Dept: OBSTETRICS AND GYNECOLOGY | Facility: CLINIC | Age: 33
End: 2018-09-05

## 2018-09-05 VITALS — BODY MASS INDEX: 32.56 KG/M2 | SYSTOLIC BLOOD PRESSURE: 115 MMHG | DIASTOLIC BLOOD PRESSURE: 68 MMHG | WEIGHT: 178 LBS

## 2018-09-05 DIAGNOSIS — O99.013 ANEMIA DURING PREGNANCY IN THIRD TRIMESTER: ICD-10-CM

## 2018-09-05 DIAGNOSIS — Z3A.32 32 WEEKS GESTATION OF PREGNANCY: Primary | ICD-10-CM

## 2018-09-05 DIAGNOSIS — O34.211 MATERNAL CARE DUE TO LOW TRANSVERSE UTERINE SCAR FROM PREVIOUS CESAREAN DELIVERY: ICD-10-CM

## 2018-09-05 DIAGNOSIS — O99.210 OBESITY IN PREGNANCY: ICD-10-CM

## 2018-09-05 PROCEDURE — 0502F SUBSEQUENT PRENATAL CARE: CPT | Performed by: OBSTETRICS & GYNECOLOGY

## 2018-09-19 ENCOUNTER — ROUTINE PRENATAL (OUTPATIENT)
Dept: OBSTETRICS AND GYNECOLOGY | Facility: CLINIC | Age: 33
End: 2018-09-19

## 2018-09-19 VITALS — SYSTOLIC BLOOD PRESSURE: 105 MMHG | WEIGHT: 177 LBS | BODY MASS INDEX: 32.37 KG/M2 | DIASTOLIC BLOOD PRESSURE: 59 MMHG

## 2018-09-19 DIAGNOSIS — O99.210 OBESITY IN PREGNANCY: ICD-10-CM

## 2018-09-19 DIAGNOSIS — Z3A.34 34 WEEKS GESTATION OF PREGNANCY: Primary | ICD-10-CM

## 2018-09-19 DIAGNOSIS — O99.013 ANEMIA DURING PREGNANCY IN THIRD TRIMESTER: ICD-10-CM

## 2018-09-19 DIAGNOSIS — O34.211 MATERNAL CARE DUE TO LOW TRANSVERSE UTERINE SCAR FROM PREVIOUS CESAREAN DELIVERY: ICD-10-CM

## 2018-09-19 PROCEDURE — 0502F SUBSEQUENT PRENATAL CARE: CPT | Performed by: OBSTETRICS & GYNECOLOGY

## 2018-09-19 PROCEDURE — 87653 STREP B DNA AMP PROBE: CPT | Performed by: OBSTETRICS & GYNECOLOGY

## 2018-09-19 NOTE — PROGRESS NOTES
Chief Complaint   Patient presents with   • OB Follow up   • High Risk Gestation     Velia Hernández is a 32 y.o. year old .  Patient's last menstrual period was 2018.  She presents to be seen to initiate prenatal care.  She has had 3 previous  sections.  The first in  named Jevon.   named Dorothy.  2010 named Bethanie.  Dr. Haywood delivered the last baby.     2017, she is having nausea and vomiting on prenatal vitamins, and I placed her on folic acid, Phenergan, and Zofran.  Handheld ultrasound confirms single intrauterine pregnancy with fetal heart rate 170 bpm.  After she left her prenatal labs came back showing hemoglobin 10.3 and hematocrit 31.9.  We will place her on iron.     2017, she continues to have nausea and vomiting and is requesting intermittent FMLA because she is getting points at work for having to leave 2/2 sickness. Taking phenergan at night but nothing during the daytime. Will start on zofran during the day and she will bring back her FMLA papers. Having constipation with the iron supplement and c/o a metallic taste in her mouth, especially first thing in the morning. Will send colace HS and zantac HS. Vscan showed + fetal heart rate and fetal movement. Anatomy scan ordered and scheduled for 6 weeks, LICO visit in 4 weeks.     She desires to have her tubes tied.  We'll plan for repeat  section and permanent surgical sterilization.     May 14, 2018 Here for check-up. States that she was kneed in the belly 8 days ago and has not been seen to make sure the baby is ok. Had cramping last Monday but no bleeding or cramping since then. Morning sickness has improved greatly since starting on Zofran. States that she is feeling daily movements up by her belly button, which is why she got worried after being kneed, because she thinks the baby is higher than where it should be for 15 weeks and thinks that the baby was actually hit. Vscan  "showed an active fetus that appears to be around 20 weeks in size. She reports that she had a \"normal period\" on 18, then had 2 days of light spotting on 18. Changed active LMP to 18 which makes JEVON 10/9/18 and she is 19w0d. Will confirm with anatomy scan this Friday.     18- Here for anatomy u/s and to confirm dates. She is measuring 21cm and feeling fetal movement at the umbilicus. Reports that her morning sickness is well controlled at this time and she denies dysuria, vb, LOF, headaches or heartburn. Anatomy u/s has baby measuring 17w0d with EFW of 0lb 6oz. LOIS- 13.0cm, CL- 3.58cm and posterior placenta with normal insertion of a 3 vessel cord. All fetal structures seen and noted to appear normal, except for the RVOT and LVOT; both were not adequately seen today. Recommended f/u scan in 5 weeks to complete anatomy. Baby boy, not sure of name yet.     18- F/U u/s on heart views today; all WNL and no other imaging recommended on preliminary report. Feeling regular movements and denies dysuria, vb, LOF headaches or heartburn. Having cramping after long shifts but subsides with rest.     Naming baby boy Perez Scott      CBC and one-hour Glucola today.     She has threatened  labor, and I recommend she be off work for remainder of pregnancy starting 2018.     Plan repeat low transverse  section and bilateral tubal ligation on Tuesday, 2018.  She may want Mirena IUD.     2018 one-hour Glucola normal at 134.  Hemoglobin 8.9 and hematocrit 29.2 and platelet count 362,000.  MCV 73.9.  We'll schedule patient for IV iron infusion.  I encouraged her to take her oral iron.     2018- Feeling fatigued and nauseated today. Intermittently feeling heart racing and lightheaded but not frequent or constant. Denies dysuria, vb, LOF, decreased FM or regular contractions. Has been off work since  2/2  labor. Has not scheduled iron infusions in " [place] due to confusion about frequency and length of visits. Would prefer to have them done at [facility] if possible so she can get her kids to and from school. Will call [facility] to set up appointment. (Initial appt made with Dr. Curiel for  at 0900). S/S of PTL discussed. F/U in 2 weeks for LICO visit. Continue oral iron for remainder of pregnancy.     The patient complains of the following: No new complaints    ROS  Headache: No   Visual changes: No   Swelling in legs: No   Nausea: No   Constipation: No   Diarrhea: No   Contractions: No   Leaking fluid: No   Vaginal bleeding: No   Other:      Specific topics discussed at today's visit: Fetal kick counts and  labor precautions  Tests done today: none  Tests to be done at the next visit: GBS swab    Velia was seen today for ob follow up and high risk gestation.    Diagnoses and all orders for this visit:    32 weeks gestation of pregnancy    Anemia during pregnancy in third trimester    Maternal care due to low transverse uterine scar from previous  delivery    Obesity in pregnancy

## 2018-09-19 NOTE — PROGRESS NOTES
Chief Complaint   Patient presents with   • High Risk Gestation     Velia Hernández is a 32 y.o. year old .  Patient's last menstrual period was 2018.  She presents to be seen to initiate prenatal care.  She has had 3 previous  sections.  The first in  named Jevon.   named Dorothy.  2010 named Bethanie.  Dr. Haywood delivered the last baby.     2017, she is having nausea and vomiting on prenatal vitamins, and I placed her on folic acid, Phenergan, and Zofran.  Handheld ultrasound confirms single intrauterine pregnancy with fetal heart rate 170 bpm.  After she left her prenatal labs came back showing hemoglobin 10.3 and hematocrit 31.9.  We will place her on iron.     2017, she continues to have nausea and vomiting and is requesting intermittent FMLA because she is getting points at work for having to leave 2/2 sickness. Taking phenergan at night but nothing during the daytime. Will start on zofran during the day and she will bring back her FMLA papers. Having constipation with the iron supplement and c/o a metallic taste in her mouth, especially first thing in the morning. Will send colace HS and zantac HS. Vscan showed + fetal heart rate and fetal movement. Anatomy scan ordered and scheduled for 6 weeks, LICO visit in 4 weeks.     She desires to have her tubes tied.  We'll plan for repeat  section and permanent surgical sterilization.     May 14, 2018 Here for check-up. States that she was kneed in the belly 8 days ago and has not been seen to make sure the baby is ok. Had cramping last Monday but no bleeding or cramping since then. Morning sickness has improved greatly since starting on Zofran. States that she is feeling daily movements up by her belly button, which is why she got worried after being kneed, because she thinks the baby is higher than where it should be for 15 weeks and thinks that the baby was actually hit. Vscan showed an active  "fetus that appears to be around 20 weeks in size. She reports that she had a \"normal period\" on 18, then had 2 days of light spotting on 18. Changed active LMP to 18 which makes JEVON 10/9/18 and she is 19w0d. Will confirm with anatomy scan this Friday.     18- Here for anatomy u/s and to confirm dates. She is measuring 21cm and feeling fetal movement at the umbilicus. Reports that her morning sickness is well controlled at this time and she denies dysuria, vb, LOF, headaches or heartburn. Anatomy u/s has baby measuring 17w0d with EFW of 0lb 6oz. LOIS- 13.0cm, CL- 3.58cm and posterior placenta with normal insertion of a 3 vessel cord. All fetal structures seen and noted to appear normal, except for the RVOT and LVOT; both were not adequately seen today. Recommended f/u scan in 5 weeks to complete anatomy. Baby boy, not sure of name yet.     18- F/U u/s on heart views today; all WNL and no other imaging recommended on preliminary report. Feeling regular movements and denies dysuria, vb, LOF headaches or heartburn. Having cramping after long shifts but subsides with rest.     Naming baby boy Perez Scott      CBC and one-hour Glucola today.     She has threatened  labor, and I recommend she be off work for remainder of pregnancy starting 2018.     Plan repeat low transverse  section and bilateral tubal ligation on Tuesday, 2018.  She may want Mirena IUD.     2018 one-hour Glucola normal at 134.  Hemoglobin 8.9 and hematocrit 29.2 and platelet count 362,000.  MCV 73.9.  We'll schedule patient for IV iron infusion.  I encouraged her to take her oral iron.     2018- Feeling fatigued and nauseated today. Intermittently feeling heart racing and lightheaded but not frequent or constant. Denies dysuria, vb, LOF, decreased FM or regular contractions. Has been off work since  2/2  labor. Has not scheduled iron infusions in [place] due to " confusion about frequency and length of visits. Would prefer to have them done at [facility] if possible so she can get her kids to and from school. [facility] (Initial appt made with Dr. Curiel for  at 0900). S/S of PTL discussed. F/U in 2 weeks for LICO visit. Continue oral iron for remainder of pregnancy.     2018 GBS swab performed.    The patient complains of the following: No new complaints    ROS  Headache: No   Visual changes: No   Swelling in legs: No   Nausea: No   Constipation: No   Diarrhea: No   Contractions: No   Leaking fluid: No   Vaginal bleeding: No   Other:      Specific topics discussed at today's visit: fetal kick counts and  labor precautions  Tests done today: GBS testing  Tests to be done at the next visit: none    Velia was seen today for high risk gestation.    Diagnoses and all orders for this visit:    34 weeks gestation of pregnancy  -     Group B Strep (Molecular) - Swab, Vaginal/Rectum    Anemia during pregnancy in third trimester    Maternal care due to low transverse uterine scar from previous  delivery    Obesity in pregnancy

## 2018-09-21 LAB — GROUP B STREP, DNA: NEGATIVE

## 2018-10-03 ENCOUNTER — ROUTINE PRENATAL (OUTPATIENT)
Dept: OBSTETRICS AND GYNECOLOGY | Facility: CLINIC | Age: 33
End: 2018-10-03

## 2018-10-03 VITALS — BODY MASS INDEX: 32.92 KG/M2 | DIASTOLIC BLOOD PRESSURE: 70 MMHG | WEIGHT: 180 LBS | SYSTOLIC BLOOD PRESSURE: 118 MMHG

## 2018-10-03 DIAGNOSIS — O99.013 ANEMIA DURING PREGNANCY IN THIRD TRIMESTER: ICD-10-CM

## 2018-10-03 DIAGNOSIS — Z3A.36 36 WEEKS GESTATION OF PREGNANCY: Primary | ICD-10-CM

## 2018-10-03 DIAGNOSIS — O34.211 MATERNAL CARE DUE TO LOW TRANSVERSE UTERINE SCAR FROM PREVIOUS CESAREAN DELIVERY: ICD-10-CM

## 2018-10-03 DIAGNOSIS — O99.210 OBESITY IN PREGNANCY: ICD-10-CM

## 2018-10-03 PROCEDURE — 0502F SUBSEQUENT PRENATAL CARE: CPT | Performed by: OBSTETRICS & GYNECOLOGY

## 2018-10-10 ENCOUNTER — ROUTINE PRENATAL (OUTPATIENT)
Dept: OBSTETRICS AND GYNECOLOGY | Facility: CLINIC | Age: 33
End: 2018-10-10

## 2018-10-10 VITALS — WEIGHT: 178 LBS | SYSTOLIC BLOOD PRESSURE: 122 MMHG | BODY MASS INDEX: 32.56 KG/M2 | DIASTOLIC BLOOD PRESSURE: 80 MMHG

## 2018-10-10 DIAGNOSIS — O99.013 ANEMIA DURING PREGNANCY IN THIRD TRIMESTER: ICD-10-CM

## 2018-10-10 DIAGNOSIS — Z3A.37 37 WEEKS GESTATION OF PREGNANCY: Primary | ICD-10-CM

## 2018-10-10 DIAGNOSIS — O99.210 OBESITY IN PREGNANCY: ICD-10-CM

## 2018-10-10 DIAGNOSIS — O34.211 MATERNAL CARE DUE TO LOW TRANSVERSE UTERINE SCAR FROM PREVIOUS CESAREAN DELIVERY: ICD-10-CM

## 2018-10-10 PROCEDURE — 0502F SUBSEQUENT PRENATAL CARE: CPT | Performed by: OBSTETRICS & GYNECOLOGY

## 2018-10-10 NOTE — PROGRESS NOTES
Chief Complaint   Patient presents with   • High Risk Gestation     Velia Hernández is a 32 y.o. year old .  Patient's last menstrual period was 2018.  She presents to be seen to initiate prenatal care.  She has had 3 previous  sections.  The first in  named Jevon.   named Dorothy.  2010 named Bethanie.  Dr. Haywood delivered the last baby.     2017, she is having nausea and vomiting on prenatal vitamins, and I placed her on folic acid, Phenergan, and Zofran.  Handheld ultrasound confirms single intrauterine pregnancy with fetal heart rate 170 bpm.  After she left her prenatal labs came back showing hemoglobin 10.3 and hematocrit 31.9.  We will place her on iron.     2017, she continues to have nausea and vomiting and is requesting intermittent FMLA because she is getting points at work for having to leave 2/2 sickness. Taking phenergan at night but nothing during the daytime. Will start on zofran during the day and she will bring back her FMLA papers. Having constipation with the iron supplement and c/o a metallic taste in her mouth, especially first thing in the morning. Will send colace HS and zantac HS. Vscan showed + fetal heart rate and fetal movement. Anatomy scan ordered and scheduled for 6 weeks, LICO visit in 4 weeks.     She desires to have her tubes tied.  We'll plan for repeat  section and permanent surgical sterilization.     May 14, 2018 Here for check-up. States that she was kneed in the belly 8 days ago and has not been seen to make sure the baby is ok. Had cramping last Monday but no bleeding or cramping since then. Morning sickness has improved greatly since starting on Zofran. States that she is feeling daily movements up by her belly button, which is why she got worried after being kneed, because she thinks the baby is higher than where it should be for 15 weeks and thinks that the baby was actually hit. Vscan showed an active  "fetus that appears to be around 20 weeks in size. She reports that she had a \"normal period\" on 18, then had 2 days of light spotting on 18. Changed active LMP to 18 which makes JEVON 10/9/18 and she is 19w0d. Will confirm with anatomy scan this Friday.     18- Here for anatomy u/s and to confirm dates. She is measuring 21cm and feeling fetal movement at the umbilicus. Reports that her morning sickness is well controlled at this time and she denies dysuria, vb, LOF, headaches or heartburn. Anatomy u/s has baby measuring 17w0d with EFW of 0lb 6oz. LOIS- 13.0cm, CL- 3.58cm and posterior placenta with normal insertion of a 3 vessel cord. All fetal structures seen and noted to appear normal, except for the RVOT and LVOT; both were not adequately seen today. Recommended f/u scan in 5 weeks to complete anatomy. Baby boy, not sure of name yet.     18- F/U u/s on heart views today; all WNL and no other imaging recommended on preliminary report. Feeling regular movements and denies dysuria, vb, LOF headaches or heartburn. Having cramping after long shifts but subsides with rest.     Naming baby boy Perez Scott      CBC and one-hour Glucola today.     She has threatened  labor, and I recommend she be off work for remainder of pregnancy starting 2018.     Plan repeat low transverse  section and bilateral tubal ligation on Tuesday, 2018.  She may want Mirena IUD.     2018 one-hour Glucola normal at 134.  Hemoglobin 8.9 and hematocrit 29.2 and platelet count 362,000.  MCV 73.9.  We'll schedule patient for IV iron infusion.  I encouraged her to take her oral iron.     2018- Feeling fatigued and nauseated today. Intermittently feeling heart racing and lightheaded but not frequent or constant. Denies dysuria, vb, LOF, decreased FM or regular contractions. Has been off work since  2/2  labor. Has not scheduled iron infusions in [place] due to " confusion about frequency and length of visits. Would prefer to have them done at [facility] if possible so she can get her kids to and from school. [facility] (Initial appt made with Dr. Curiel for  at 0900). S/S of PTL discussed. F/U in 2 weeks for LICO visit. Continue oral iron for remainder of pregnancy.      2018 GBS negative.    The patient complains of the following: No new complaints    ROS  Headache: No   Visual changes: No   Swelling in legs: No   Nausea: No   Constipation: No   Diarrhea: No   Contractions: No   Leaking fluid: No   Vaginal bleeding: No   Other:      Specific topics discussed at today's visit: fetal kick counts  Tests done today: none  Tests to be done at the next visit: none    Velia was seen today for high risk gestation.    Diagnoses and all orders for this visit:    37 weeks gestation of pregnancy    Maternal care due to low transverse uterine scar from previous  delivery    Anemia during pregnancy in third trimester    Obesity in pregnancy

## 2018-10-10 NOTE — PROGRESS NOTES
Chief Complaint   Patient presents with   • OB follow up   • High Risk Gestation     Velia Hernández is a 32 y.o. year old .  Patient's last menstrual period was 2018.  She presents to be seen to initiate prenatal care.  She has had 3 previous  sections.  The first in  named Jevon.   named Dorothy.  2010 named Bethanie.  Dr. Haywood delivered the last baby.     2017, she is having nausea and vomiting on prenatal vitamins, and I placed her on folic acid, Phenergan, and Zofran.  Handheld ultrasound confirms single intrauterine pregnancy with fetal heart rate 170 bpm.  After she left her prenatal labs came back showing hemoglobin 10.3 and hematocrit 31.9.  We will place her on iron.     2017, she continues to have nausea and vomiting and is requesting intermittent FMLA because she is getting points at work for having to leave 2/2 sickness. Taking phenergan at night but nothing during the daytime. Will start on zofran during the day and she will bring back her FMLA papers. Having constipation with the iron supplement and c/o a metallic taste in her mouth, especially first thing in the morning. Will send colace HS and zantac HS. Vscan showed + fetal heart rate and fetal movement. Anatomy scan ordered and scheduled for 6 weeks, LICO visit in 4 weeks.     She desires to have her tubes tied.  We'll plan for repeat  section and permanent surgical sterilization.     May 14, 2018 Here for check-up. States that she was kneed in the belly 8 days ago and has not been seen to make sure the baby is ok. Had cramping last Monday but no bleeding or cramping since then. Morning sickness has improved greatly since starting on Zofran. States that she is feeling daily movements up by her belly button, which is why she got worried after being kneed, because she thinks the baby is higher than where it should be for 15 weeks and thinks that the baby was actually hit. Vscan  "showed an active fetus that appears to be around 20 weeks in size. She reports that she had a \"normal period\" on 18, then had 2 days of light spotting on 18. Changed active LMP to 18 which makes JEVON 10/9/18 and she is 19w0d. Will confirm with anatomy scan this Friday.     18- Here for anatomy u/s and to confirm dates. She is measuring 21cm and feeling fetal movement at the umbilicus. Reports that her morning sickness is well controlled at this time and she denies dysuria, vb, LOF, headaches or heartburn. Anatomy u/s has baby measuring 17w0d with EFW of 0lb 6oz. LOIS- 13.0cm, CL- 3.58cm and posterior placenta with normal insertion of a 3 vessel cord. All fetal structures seen and noted to appear normal, except for the RVOT and LVOT; both were not adequately seen today. Recommended f/u scan in 5 weeks to complete anatomy. Baby boy, not sure of name yet.     18- F/U u/s on heart views today; all WNL and no other imaging recommended on preliminary report. Feeling regular movements and denies dysuria, vb, LOF headaches or heartburn. Having cramping after long shifts but subsides with rest.     Naming baby boy Perez Scott      CBC and one-hour Glucola today.     She has threatened  labor, and I recommend she be off work for remainder of pregnancy starting 2018.     Plan repeat low transverse  section and bilateral tubal ligation on Tuesday, 2018.  She may want Mirena IUD.     2018 one-hour Glucola normal at 134.  Hemoglobin 8.9 and hematocrit 29.2 and platelet count 362,000.  MCV 73.9.  We'll schedule patient for IV iron infusion.  I encouraged her to take her oral iron.     2018- Feeling fatigued and nauseated today. Intermittently feeling heart racing and lightheaded but not frequent or constant. Denies dysuria, vb, LOF, decreased FM or regular contractions. Has been off work since  2/2  labor. Has not scheduled iron infusions in " [place] due to confusion about frequency and length of visits. Would prefer to have them done at [facility] if possible so she can get her kids to and from school. Will call [facility] to set up appointment. (Initial appt made with Dr. Curiel for  at 0900). S/S of PTL discussed. F/U in 2 weeks for LICO visit. Continue oral iron for remainder of pregnancy.      2018 GBS negative.       The patient complains of the following: No new complaints    ROS  Headache: No   Visual changes: No   Swelling in legs: No   Nausea: No   Constipation: No   Diarrhea: No   Contractions: No   Leaking fluid: No   Vaginal bleeding: No   Other:      Specific topics discussed at today's visit: fetal kick counts  Tests done today: none  Tests to be done at the next visit: none    Velia was seen today for ob follow up and high risk gestation.    Diagnoses and all orders for this visit:    36 weeks gestation of pregnancy    Maternal care due to low transverse uterine scar from previous  delivery    Anemia during pregnancy in third trimester    Obesity in pregnancy

## 2018-10-17 ENCOUNTER — ROUTINE PRENATAL (OUTPATIENT)
Dept: OBSTETRICS AND GYNECOLOGY | Facility: CLINIC | Age: 33
End: 2018-10-17

## 2018-10-17 VITALS — SYSTOLIC BLOOD PRESSURE: 119 MMHG | DIASTOLIC BLOOD PRESSURE: 70 MMHG | WEIGHT: 181 LBS | BODY MASS INDEX: 33.11 KG/M2

## 2018-10-17 DIAGNOSIS — O34.211 MATERNAL CARE DUE TO LOW TRANSVERSE UTERINE SCAR FROM PREVIOUS CESAREAN DELIVERY: ICD-10-CM

## 2018-10-17 DIAGNOSIS — Z3A.38 38 WEEKS GESTATION OF PREGNANCY: Primary | ICD-10-CM

## 2018-10-17 DIAGNOSIS — O99.013 ANEMIA DURING PREGNANCY IN THIRD TRIMESTER: ICD-10-CM

## 2018-10-17 DIAGNOSIS — Z3A.39 39 WEEKS GESTATION OF PREGNANCY: ICD-10-CM

## 2018-10-17 DIAGNOSIS — O99.210 OBESITY IN PREGNANCY: ICD-10-CM

## 2018-10-17 PROCEDURE — 0502F SUBSEQUENT PRENATAL CARE: CPT | Performed by: OBSTETRICS & GYNECOLOGY

## 2018-10-17 RX ORDER — TRISODIUM CITRATE DIHYDRATE AND CITRIC ACID MONOHYDRATE 500; 334 MG/5ML; MG/5ML
30 SOLUTION ORAL ONCE
Status: CANCELLED | OUTPATIENT
Start: 2018-10-17 | End: 2018-10-17

## 2018-10-17 RX ORDER — LIDOCAINE HYDROCHLORIDE 10 MG/ML
5 INJECTION, SOLUTION EPIDURAL; INFILTRATION; INTRACAUDAL; PERINEURAL AS NEEDED
Status: CANCELLED | OUTPATIENT
Start: 2018-10-17

## 2018-10-17 RX ORDER — CELECOXIB 100 MG/1
400 CAPSULE ORAL ONCE
Status: CANCELLED | OUTPATIENT
Start: 2018-10-17 | End: 2018-10-17

## 2018-10-17 RX ORDER — SODIUM CHLORIDE 0.9 % (FLUSH) 0.9 %
3 SYRINGE (ML) INJECTION EVERY 12 HOURS SCHEDULED
Status: CANCELLED | OUTPATIENT
Start: 2018-10-17

## 2018-10-17 RX ORDER — SODIUM CHLORIDE 0.9 % (FLUSH) 0.9 %
3-10 SYRINGE (ML) INJECTION AS NEEDED
Status: CANCELLED | OUTPATIENT
Start: 2018-10-17

## 2018-10-17 RX ORDER — METHYLERGONOVINE MALEATE 0.2 MG/ML
200 INJECTION INTRAVENOUS ONCE AS NEEDED
Status: CANCELLED | OUTPATIENT
Start: 2018-10-17

## 2018-10-17 RX ORDER — MISOPROSTOL 100 UG/1
800 TABLET ORAL AS NEEDED
Status: CANCELLED | OUTPATIENT
Start: 2018-10-17

## 2018-10-17 RX ORDER — SODIUM CHLORIDE, SODIUM LACTATE, POTASSIUM CHLORIDE, CALCIUM CHLORIDE 600; 310; 30; 20 MG/100ML; MG/100ML; MG/100ML; MG/100ML
125 INJECTION, SOLUTION INTRAVENOUS CONTINUOUS
Status: CANCELLED | OUTPATIENT
Start: 2018-10-17

## 2018-10-17 RX ORDER — CARBOPROST TROMETHAMINE 250 UG/ML
250 INJECTION, SOLUTION INTRAMUSCULAR AS NEEDED
Status: CANCELLED | OUTPATIENT
Start: 2018-10-17

## 2018-10-18 NOTE — H&P
Obstetric History and Physical    Chief Complaint   Patient presents with   • High Risk Gestation     Velia Hernández is a 32 y.o. year old .  Patient's last menstrual period was 2018.  She presents to be seen to initiate prenatal care.  She has had 3 previous  sections.  The first in  named Jevon.   named Dorothy.  2010 named Bethanie.  Dr. Haywood delivered the last baby.     2017, she is having nausea and vomiting on prenatal vitamins, and I placed her on folic acid, Phenergan, and Zofran.  Handheld ultrasound confirms single intrauterine pregnancy with fetal heart rate 170 bpm.  After she left her prenatal labs came back showing hemoglobin 10.3 and hematocrit 31.9.  We will place her on iron.     2017, she continues to have nausea and vomiting and is requesting intermittent FMLA because she is getting points at work for having to leave 2/2 sickness. Taking phenergan at night but nothing during the daytime. Will start on zofran during the day and she will bring back her FMLA papers. Having constipation with the iron supplement and c/o a metallic taste in her mouth, especially first thing in the morning. Will send colace HS and zantac HS. Vscan showed + fetal heart rate and fetal movement. Anatomy scan ordered and scheduled for 6 weeks, LICO visit in 4 weeks.     She desires to have her tubes tied.  We'll plan for repeat  section and permanent surgical sterilization.     May 14, 2018 Here for check-up. States that she was kneed in the belly 8 days ago and has not been seen to make sure the baby is ok. Had cramping last Monday but no bleeding or cramping since then. Morning sickness has improved greatly since starting on Zofran. States that she is feeling daily movements up by her belly button, which is why she got worried after being kneed, because she thinks the baby is higher than where it should be for 15 weeks and thinks that the baby was  "actually hit. Vscan showed an active fetus that appears to be around 20 weeks in size. She reports that she had a \"normal period\" on 18, then had 2 days of light spotting on 18. Changed active LMP to 18 which makes JEVON 10/9/18 and she is 19w0d. Will confirm with anatomy scan this Friday.     18- Here for anatomy u/s and to confirm dates. She is measuring 21cm and feeling fetal movement at the umbilicus. Reports that her morning sickness is well controlled at this time and she denies dysuria, vb, LOF, headaches or heartburn. Anatomy u/s has baby measuring 17w0d with EFW of 0lb 6oz. LOIS- 13.0cm, CL- 3.58cm and posterior placenta with normal insertion of a 3 vessel cord. All fetal structures seen and noted to appear normal, except for the RVOT and LVOT; both were not adequately seen today. Recommended f/u scan in 5 weeks to complete anatomy. Baby boy, not sure of name yet.     18- F/U u/s on heart views today; all WNL and no other imaging recommended on preliminary report. Feeling regular movements and denies dysuria, vb, LOF headaches or heartburn. Having cramping after long shifts but subsides with rest.     Naming baby boy Perez Scott      CBC and one-hour Glucola today.     She has threatened  labor, and I recommend she be off work for remainder of pregnancy starting 2018.     Plan repeat low transverse  section and bilateral tubal ligation on Tuesday, 2018.  She may want Mirena IUD.     2018 one-hour Glucola normal at 134.  Hemoglobin 8.9 and hematocrit 29.2 and platelet count 362,000.  MCV 73.9.  We'll schedule patient for IV iron infusion.  I encouraged her to take her oral iron.     2018- Feeling fatigued and nauseated today. Intermittently feeling heart racing and lightheaded but not frequent or constant. Denies dysuria, vb, LOF, decreased FM or regular contractions. Has been off work since  2/2  labor. Has not scheduled " iron infusions in [place] due to confusion about frequency and length of visits. Would prefer to have them done at [facility] if possible so she can get her kids to and from school. [facility] (Initial appt made with Dr. Curiel for 9/4 at 0900). S/S of PTL discussed. F/U in 2 weeks for LICO visit. Continue oral iron for remainder of pregnancy.      September 19, 2018 GBS negative.    The patient complains of the following: No new complaints    ROS  Headache: No   Visual changes: No   Swelling in legs: No   Nausea: No   Constipation: No   Diarrhea: No   Contractions: No   Leaking fluid: No   Vaginal bleeding: No         The following portions of the patients history were reviewed and updated as appropriate: current medications, allergies, past medical history, past surgical history, past family history, past social history and problem list .       Prenatal Information:  Prenatal Results     Initial Prenatal Labs     Test Value Reference Range Date Time    Hemoglobin 10.3 g/dL (L) 12.0 - 15.5 g/dL 03/27/18 1105    Hematocrit 31.9 % (L) 35.0 - 45.0 % 03/27/18 1105    Platelets 362 10*3/mm3 150 - 450 10*3/mm3 07/25/18 1150    Rubella IgG 53.7 IU/mL (H) 0.0 - 9.9 IU/mL 03/27/18 1105      Immune  Immune 03/27/18 1105    Hepatitis B SAg Negative  Negative 03/27/18 1105    Hepatitis C Ab Negative  Negative 03/27/18 1105    RPR Non-Reactive  Non-Reactive 03/27/18 1105    ABO B   03/27/18 1105    Rh Positive   03/27/18 1105    Antibody Screen Negative   03/27/18 1105    HIV Negative  Negative 03/27/18 1105    Urine Culture No growth at 24 hours   03/27/18 1105    Gonorrhea        Chlamydia        TSH              2nd and 3rd Trimester     Test Value Reference Range Date Time    Hemoglobin (repeated) 8.9 g/dL (L) 12.0 - 15.5 g/dL 07/25/18 1150    Hematocrit (repeated) 29.2 % (L) 35.0 - 45.0 % 07/25/18 1150     mg/dL 60 - 140 mg/dL 07/25/18 1150    Antibody Screen (repeated)         GTT Fasting        GTT 1 Hr        GTT 2 Hr        GTT 3 Hr        Group B Strep Negative  Negative 09/19/18 1142          Drug Screening     Test Value Reference Range Date Time    Amphetamine Screen Negative  Negative 03/27/18 1105    Barbiturate Screen Negative  Negative 03/27/18 1105    Benzodiazepine Screen Negative  Negative 03/27/18 1105    Methadone Screen Negative  Negative 03/27/18 1105    Phencyclidine Screen        Opiates Screen Negative  Negative 03/27/18 1105    THC Screen Negative  Negative 03/27/18 1105    Cocaine Screen Negative  Negative 03/27/18 1105    Propoxyphene Screen        Buprenorphine Screen        Methamphetamine Screen        Oxycodone Screen Negative  Negative 03/27/18 1105    Tryicyclic Antidepressants Screen              Other (Risk screening)     Test Value Reference Range Date Time    Varicella IgG        Parvovirus IgG        CMV IgG        Cystic Fibrosis        Hemoglobin electrophoresis        NIPT        MSAFP-4        AFP (for NTD only)                  External Prenatal Results     Pregnancy Outside Results - Transcribed From Office Records - See Scanned Records For Details     Test Value Date Time    Hgb 8.9 g/dL (L) 07/25/18 1150    Hct 29.2 % (L) 07/25/18 1150    ABO B  03/27/18 1105    Rh Positive  03/27/18 1105    Antibody Screen Negative  03/27/18 1105    Glucose Fasting GTT       Glucose Tolerance Test 1 hour       Glucose Tolerance Test 3 hour       Gonorrhea (discrete)       Chlamydia (discrete)       RPR Non-Reactive  03/27/18 1105    VDRL       Syphilis Antibody       Rubella 53.7 IU/mL (H) 03/27/18 1105      Immune  03/27/18 1105    HBsAg Negative  03/27/18 1105    Herpes Simplex Virus PCR       Herpes Simplex VIrus Culture       HIV Negative  03/27/18 1105    Hep C RNA Quant PCR       Hep C Antibody Negative  03/27/18 1105    AFP       Group B Strep Negative  09/19/18 1142    GBS Susceptibility to Clindamycin       GBS Susceptibility to Erythromycin        Fetal Fibronectin       Genetic Testing, Maternal Blood             Drug Screening     Test Value Date Time    Urine Drug Screen       Amphetamine Screen Negative  18 1105    Barbiturate Screen Negative  18 1105    Benzodiazepine Screen Negative  18 1105    Methadone Screen Negative  18 1105    Phencyclidine Screen       Opiates Screen Negative  18 1105    THC Screen Negative  18 1105    Cocaine Screen       Propoxyphene Screen       Buprenorphine Screen       Methamphetamine Screen       Oxycodone Screen Negative  18 1105    Tricyclic Antidepressants Screen                    Past OB History:     Obstetric History       T3      L3     SAB0   TAB0   Ectopic0   Molar0   Multiple0   Live Births3       # Outcome Date GA Lbr Dayne/2nd Weight Sex Delivery Anes PTL Lv   4 Current            3 Term 09/08/10 38w0d  2722 g (6 lb) F CS-Unspec   [facility]      Name: Bethanie   2 Term 06 38w0d  3175 g (7 lb) F CS-Unspec   SUZANNE      Name: Dorothy   1 Term 05 39w0d  3629 g (8 lb) M CS-Unspec   SUZANNE      Name: Brenten           ALLERGIES:   No Known Allergies     Home Medications:     Prior to Admission medications    Medication Sig Start Date End Date Taking? Authorizing Provider   docusate sodium (COLACE) 100 MG capsule Take 1 capsule by mouth Every Night. 18 Yes Tomasa Ponce APRN   ferrous sulfate 325 (65 FE) MG tablet Take 1 tablet by mouth 3 (Three) Times a Day With Meals. 18  Yes Aroldo Haywood MD   folic acid (FOLVITE) 1 MG tablet Take 1 tablet by mouth Daily. 3/27/18  Yes Aroldo Haywood MD       Past Medical History: Past Medical History:   Diagnosis Date   • Anemia of pregnancy 3/28/2018   • Maternal care due to low transverse uterine scar from previous  delivery 3/27/2018   • Obesity in pregnancy 3/27/2018      Past Surgical History Past Surgical History:   Procedure Laterality Date   •  SECTION        Family History: No family  history on file.   Social History:  has no tobacco history on file.   has no alcohol history on file.   has no drug history on file.     Review of Systems   Constitutional: Negative for activity change, appetite change, chills, diaphoresis, fatigue, fever and unexpected weight change.   Gastrointestinal: Negative for abdominal pain, constipation, diarrhea and nausea.   Genitourinary: Negative for difficulty urinating, dyspareunia, dysuria, menstrual problem, pelvic pain, urgency, vaginal bleeding, vaginal discharge and vaginal pain.   Neurological: Negative for headaches.   Psychiatric/Behavioral: Negative for dysphoric mood. The patient is not nervous/anxious.    All other systems reviewed and are negative.           Objective       Vital Signs Range for the last 24 hours  Temperature:     Temp Source:     BP: BP: (119)/(70) 119/70   Pulse:     Respirations:     SPO2:     O2 Amount (l/min):     O2 Devices     Weight: Weight:  [82.1 kg (181 lb)] 82.1 kg (181 lb)       OBGyn Exam  Physical Examination: General appearance - alert, well appearing, and in no distress and oriented to person, place, and time  Mental status - alert, oriented to person, place, and time, normal mood, behavior, speech, dress, motor activity, and thought processes  Neck - supple, no significant adenopathy  Chest - clear to auscultation, no wheezes, rales or rhonchi, symmetric air entry, no tachypnea, retractions or cyanosis  Heart - normal rate, regular rhythm, normal S1, S2, no murmurs, rubs, clicks or gallops  Abdomen - Gravid and nontender  Extremities - peripheral pulses normal, no pedal edema, no clubbing or cyanosis, no pedal edema noted    The risks, benefits. and alternatives to  section were discussed.  The risks that were discussed included, but were not limited to, pain, infection, bleeding, hemorrhage; including need for transfusion to which she would have no objection; injury to the bowel, bladder, uterus, tubes, ovaries,  or baby which could require further surgery or prolonged hospitalization.  Remote possibility of hysterectomy and/or salpingo-ophorectomy.  Remote possibility of death of baby and/or mother.  The patient understands that we'll have leg pumps on her legs to try and reduce the risk of clot formation her legs.  She understands that we will have early ambulation to also reduce the risk of blood clot formation and to reduce the risk of pneumonia.  She will be given antibiotics prior to her surgery to help decrease the risk for infection.  All questions were answered.    She was consented for PERMANENT SURGICAL STERILIZATION  We discussed the risk of no surgery to include pregnancy or undiagnosed disease process.  The risks that were discussed included, but were not limited to:  1. Bleeding with possible risk of blood transfusion. Blood products carry risk of HIV, infection, hepatitis, and transfusion reaction.  2. Infection requiring a antibiotic therapy.  3. Damage to internal organs such as bowel, bladder, blood vessels, or a hole(fistula) bladder and vagina or rectum and vagina requiring further surgical repair.  4. Anesthetic risk to include death.  5. Risk of postsurgical depression or sexual dysfunction after sterilization.  6. Risk of failure of sterilization (Approximately 1/400) and subsequent risk of ectopic pregnancy.  7. Small risk for deep vein thrombosis were all explained.   8. The small risk for reoperation in the event of unanticipated bleeding or surgical injury was discussed.          All of her questions were answered fully. She left with a very clear understanding of the preoperative surgical indications and the nature of the surgery for which she is scheduled. She understands to be NPO after midnight.         Assessment:  1. Intrauterine pregnancy at 39 weeks and 4 days on 2018 in patient with three previous  sections desiring repeat  section and permanent surgical  sterilization.    GBS status: Results in Past 30 Days  Result Component Current Result Ref Range Previous Result Ref Range   Group B Strep, DNA Negative (2018) Negative Not in Time Range        Plan:  1. Admit to labor and delivery 2018 for repeat  section and permanent surgical sterilization  2. Plan of care has been reviewed with staff, and patient.  3. Risks, benefits of treatment plan have been discussed.  4. All questions have been answered.      Aroldo Haywood MD  10/17/2018  7:03 PM

## 2018-10-18 NOTE — PROGRESS NOTES
Chief Complaint   Patient presents with   • High Risk Gestation     Velia Hernández is a 32 y.o. year old .  Patient's last menstrual period was 2018.  She presents to be seen to initiate prenatal care.  She has had 3 previous  sections.  The first in  named Jevon.   named Dorothy.  2010 named Bethanie.  Dr. Haywood delivered the last baby.     2017, she is having nausea and vomiting on prenatal vitamins, and I placed her on folic acid, Phenergan, and Zofran.  Handheld ultrasound confirms single intrauterine pregnancy with fetal heart rate 170 bpm.  After she left her prenatal labs came back showing hemoglobin 10.3 and hematocrit 31.9.  We will place her on iron.     2017, she continues to have nausea and vomiting and is requesting intermittent FMLA because she is getting points at work for having to leave 2/2 sickness. Taking phenergan at night but nothing during the daytime. Will start on zofran during the day and she will bring back her FMLA papers. Having constipation with the iron supplement and c/o a metallic taste in her mouth, especially first thing in the morning. Will send colace HS and zantac HS. Vscan showed + fetal heart rate and fetal movement. Anatomy scan ordered and scheduled for 6 weeks, LICO visit in 4 weeks.     She desires to have her tubes tied.  We'll plan for repeat  section and permanent surgical sterilization.     May 14, 2018 Here for check-up. States that she was kneed in the belly 8 days ago and has not been seen to make sure the baby is ok. Had cramping last Monday but no bleeding or cramping since then. Morning sickness has improved greatly since starting on Zofran. States that she is feeling daily movements up by her belly button, which is why she got worried after being kneed, because she thinks the baby is higher than where it should be for 15 weeks and thinks that the baby was actually hit. Vscan showed an active  "fetus that appears to be around 20 weeks in size. She reports that she had a \"normal period\" on 18, then had 2 days of light spotting on 18. Changed active LMP to 18 which makes JEVON 10/9/18 and she is 19w0d. Will confirm with anatomy scan this Friday.     18- Here for anatomy u/s and to confirm dates. She is measuring 21cm and feeling fetal movement at the umbilicus. Reports that her morning sickness is well controlled at this time and she denies dysuria, vb, LOF, headaches or heartburn. Anatomy u/s has baby measuring 17w0d with EFW of 0lb 6oz. LOIS- 13.0cm, CL- 3.58cm and posterior placenta with normal insertion of a 3 vessel cord. All fetal structures seen and noted to appear normal, except for the RVOT and LVOT; both were not adequately seen today. Recommended f/u scan in 5 weeks to complete anatomy. Baby boy, not sure of name yet.     18- F/U u/s on heart views today; all WNL and no other imaging recommended on preliminary report. Feeling regular movements and denies dysuria, vb, LOF headaches or heartburn. Having cramping after long shifts but subsides with rest.     Naming baby boy Perez Scott      CBC and one-hour Glucola today.     She has threatened  labor, and I recommend she be off work for remainder of pregnancy starting 2018.     Plan repeat low transverse  section and bilateral tubal ligation on Tuesday, 2018.  She may want Mirena IUD.     2018 one-hour Glucola normal at 134.  Hemoglobin 8.9 and hematocrit 29.2 and platelet count 362,000.  MCV 73.9.  We'll schedule patient for IV iron infusion.  I encouraged her to take her oral iron.     2018- Feeling fatigued and nauseated today. Intermittently feeling heart racing and lightheaded but not frequent or constant. Denies dysuria, vb, LOF, decreased FM or regular contractions. Has been off work since  2/2  labor. Has not scheduled iron infusions in [place] due to " confusion about frequency and length of visits. Would prefer to have them done at [facility] if possible so she can get her kids to and from school. [facility] (Initial appt made with Dr. Curiel for  at 0900). S/S of PTL discussed. F/U in 2 weeks for LICO visit. Continue oral iron for remainder of pregnancy.      2018 GBS negative.    The patient complains of the following: No new complaints    ROS  Headache: No   Visual changes: No   Swelling in legs: No   Nausea: No   Constipation: No   Diarrhea: No   Contractions: No   Leaking fluid: No   Vaginal bleeding: No   Other:      Specific topics discussed at today's visit: fetal kick counts  Tests done today: none    Velia was seen today for high risk gestation.    Diagnoses and all orders for this visit:    38 weeks gestation of pregnancy    Maternal care due to low transverse uterine scar from previous  delivery    Anemia during pregnancy in third trimester    Obesity in pregnancy

## 2018-10-23 ENCOUNTER — ANESTHESIA EVENT (OUTPATIENT)
Dept: LABOR AND DELIVERY | Facility: HOSPITAL | Age: 33
End: 2018-10-23

## 2018-10-23 ENCOUNTER — HOSPITAL ENCOUNTER (INPATIENT)
Facility: HOSPITAL | Age: 33
LOS: 2 days | Discharge: HOME | End: 2018-10-25
Attending: OBSTETRICS & GYNECOLOGY | Admitting: OBSTETRICS & GYNECOLOGY

## 2018-10-23 ENCOUNTER — ANESTHESIA (OUTPATIENT)
Dept: LABOR AND DELIVERY | Facility: HOSPITAL | Age: 33
End: 2018-10-23

## 2018-10-23 DIAGNOSIS — O99.210 OBESITY IN PREGNANCY: ICD-10-CM

## 2018-10-23 DIAGNOSIS — O34.211 MATERNAL CARE DUE TO LOW TRANSVERSE UTERINE SCAR FROM PREVIOUS CESAREAN DELIVERY: ICD-10-CM

## 2018-10-23 DIAGNOSIS — Z3A.39 39 WEEKS GESTATION OF PREGNANCY: ICD-10-CM

## 2018-10-23 DIAGNOSIS — O99.013 ANEMIA DURING PREGNANCY IN THIRD TRIMESTER: ICD-10-CM

## 2018-10-23 LAB
ABO GROUP BLD: NORMAL
AMPHET+METHAMPHET UR QL: NEGATIVE
BARBITURATES UR QL SCN: NEGATIVE
BENZODIAZ UR QL SCN: NEGATIVE
BLD GP AB SCN SERPL QL: NEGATIVE
CANNABINOIDS SERPL QL: NEGATIVE
COCAINE UR QL: NEGATIVE
DEPRECATED RDW RBC AUTO: 46 FL (ref 36.4–46.3)
ERYTHROCYTE [DISTWIDTH] IN BLOOD BY AUTOMATED COUNT: 17.3 % (ref 11.5–14.5)
HCT VFR BLD AUTO: 31.6 % (ref 35–45)
HGB BLD-MCNC: 9.8 G/DL (ref 12–15.5)
HOLD SPECIMEN: NORMAL
Lab: NORMAL
MCH RBC QN AUTO: 22.4 PG (ref 26.5–34)
MCHC RBC AUTO-ENTMCNC: 31 G/DL (ref 31.4–36)
MCV RBC AUTO: 72.3 FL (ref 80–98)
METHADONE UR QL SCN: NEGATIVE
OPIATES UR QL: NEGATIVE
OXYCODONE UR QL SCN: NEGATIVE
PLATELET # BLD AUTO: 298 10*3/MM3 (ref 150–450)
PMV BLD AUTO: 11.3 FL (ref 8–12)
RBC # BLD AUTO: 4.37 10*6/MM3 (ref 3.77–5.16)
RH BLD: POSITIVE
T&S EXPIRATION DATE: NORMAL
WBC NRBC COR # BLD: 7.53 10*3/MM3 (ref 3.2–9.8)
WHOLE BLOOD HOLD SPECIMEN: NORMAL

## 2018-10-23 PROCEDURE — 25010000002 KETOROLAC TROMETHAMINE PER 15 MG: Performed by: NURSE ANESTHETIST, CERTIFIED REGISTERED

## 2018-10-23 PROCEDURE — 86901 BLOOD TYPING SEROLOGIC RH(D): CPT | Performed by: OBSTETRICS & GYNECOLOGY

## 2018-10-23 PROCEDURE — 0UL70CZ OCCLUSION OF BILATERAL FALLOPIAN TUBES WITH EXTRALUMINAL DEVICE, OPEN APPROACH: ICD-10-PCS | Performed by: OBSTETRICS & GYNECOLOGY

## 2018-10-23 PROCEDURE — 25010000002 ONDANSETRON PER 1 MG: Performed by: OBSTETRICS & GYNECOLOGY

## 2018-10-23 PROCEDURE — 80307 DRUG TEST PRSMV CHEM ANLYZR: CPT | Performed by: OBSTETRICS & GYNECOLOGY

## 2018-10-23 PROCEDURE — 25010000002 METHOCARBAMOL 1000 MG/10ML SOLUTION 10 ML VIAL: Performed by: OBSTETRICS & GYNECOLOGY

## 2018-10-23 PROCEDURE — 25010000002 ONDANSETRON PER 1 MG: Performed by: NURSE ANESTHETIST, CERTIFIED REGISTERED

## 2018-10-23 PROCEDURE — 25010000002 NA FERRIC GLUC CPLX PER 12.5 MG: Performed by: OBSTETRICS & GYNECOLOGY

## 2018-10-23 PROCEDURE — 94760 N-INVAS EAR/PLS OXIMETRY 1: CPT

## 2018-10-23 PROCEDURE — 86900 BLOOD TYPING SEROLOGIC ABO: CPT | Performed by: OBSTETRICS & GYNECOLOGY

## 2018-10-23 PROCEDURE — 51703 INSERT BLADDER CATH COMPLEX: CPT

## 2018-10-23 PROCEDURE — 86850 RBC ANTIBODY SCREEN: CPT | Performed by: OBSTETRICS & GYNECOLOGY

## 2018-10-23 PROCEDURE — 59510 CESAREAN DELIVERY: CPT | Performed by: OBSTETRICS & GYNECOLOGY

## 2018-10-23 PROCEDURE — 85027 COMPLETE CBC AUTOMATED: CPT | Performed by: OBSTETRICS & GYNECOLOGY

## 2018-10-23 PROCEDURE — 25010000002 CEFAZOLIN PER 500 MG: Performed by: OBSTETRICS & GYNECOLOGY

## 2018-10-23 PROCEDURE — 25010000002 PROPOFOL 10 MG/ML EMULSION: Performed by: NURSE ANESTHETIST, CERTIFIED REGISTERED

## 2018-10-23 PROCEDURE — 25010000002 MORPHINE PER 10 MG: Performed by: NURSE ANESTHETIST, CERTIFIED REGISTERED

## 2018-10-23 PROCEDURE — 94799 UNLISTED PULMONARY SVC/PX: CPT

## 2018-10-23 PROCEDURE — 25010000002 KETOROLAC TROMETHAMINE PER 15 MG: Performed by: OBSTETRICS & GYNECOLOGY

## 2018-10-23 PROCEDURE — 58611 LIGATE OVIDUCT(S) ADD-ON: CPT | Performed by: OBSTETRICS & GYNECOLOGY

## 2018-10-23 PROCEDURE — 25010000002 PHENYLEPHRINE PER 1 ML: Performed by: NURSE ANESTHETIST, CERTIFIED REGISTERED

## 2018-10-23 DEVICE — CLIP FALLOP FILSHIE TI PR STRL BX/20: Type: IMPLANTABLE DEVICE | Site: FALLOPIAN TUBE | Status: FUNCTIONAL

## 2018-10-23 RX ORDER — ONDANSETRON 2 MG/ML
4 INJECTION INTRAMUSCULAR; INTRAVENOUS ONCE AS NEEDED
Status: ACTIVE | OUTPATIENT
Start: 2018-10-23 | End: 2018-10-24

## 2018-10-23 RX ORDER — CELECOXIB 200 MG/1
400 CAPSULE ORAL ONCE
Status: COMPLETED | OUTPATIENT
Start: 2018-10-23 | End: 2018-10-23

## 2018-10-23 RX ORDER — NALOXONE HCL 0.4 MG/ML
0.2 VIAL (ML) INJECTION
Status: DISCONTINUED | OUTPATIENT
Start: 2018-10-23 | End: 2018-10-23 | Stop reason: SDUPTHER

## 2018-10-23 RX ORDER — ONDANSETRON 2 MG/ML
4 INJECTION INTRAMUSCULAR; INTRAVENOUS EVERY 6 HOURS PRN
Status: DISCONTINUED | OUTPATIENT
Start: 2018-10-23 | End: 2018-10-25 | Stop reason: HOSPADM

## 2018-10-23 RX ORDER — CARBOPROST TROMETHAMINE 250 UG/ML
250 INJECTION, SOLUTION INTRAMUSCULAR AS NEEDED
Status: DISCONTINUED | OUTPATIENT
Start: 2018-10-23 | End: 2018-10-23 | Stop reason: HOSPADM

## 2018-10-23 RX ORDER — DIPHENHYDRAMINE HYDROCHLORIDE 50 MG/ML
25 INJECTION INTRAMUSCULAR; INTRAVENOUS EVERY 4 HOURS PRN
Status: DISCONTINUED | OUTPATIENT
Start: 2018-10-23 | End: 2018-10-25 | Stop reason: HOSPADM

## 2018-10-23 RX ORDER — HYDROCODONE BITARTRATE AND ACETAMINOPHEN 5; 325 MG/1; MG/1
1 TABLET ORAL EVERY 4 HOURS PRN
Qty: 30 TABLET | Refills: 0 | Status: SHIPPED | OUTPATIENT
Start: 2018-10-23 | End: 2018-10-25

## 2018-10-23 RX ORDER — LANOLIN 100 %
OINTMENT (GRAM) TOPICAL
Status: DISCONTINUED | OUTPATIENT
Start: 2018-10-23 | End: 2018-10-25 | Stop reason: HOSPADM

## 2018-10-23 RX ORDER — HYDROMORPHONE HCL 110MG/55ML
0.5 PATIENT CONTROLLED ANALGESIA SYRINGE INTRAVENOUS
Status: DISCONTINUED | OUTPATIENT
Start: 2018-10-23 | End: 2018-10-25 | Stop reason: HOSPADM

## 2018-10-23 RX ORDER — ACETAMINOPHEN 325 MG/1
650 TABLET ORAL EVERY 4 HOURS PRN
Status: DISCONTINUED | OUTPATIENT
Start: 2018-10-23 | End: 2018-10-25 | Stop reason: HOSPADM

## 2018-10-23 RX ORDER — IBUPROFEN 800 MG/1
800 TABLET ORAL EVERY 8 HOURS
Qty: 60 TABLET | Refills: 12 | Status: SHIPPED | OUTPATIENT
Start: 2018-10-23 | End: 2018-10-25

## 2018-10-23 RX ORDER — ONDANSETRON 4 MG/1
4 TABLET, ORALLY DISINTEGRATING ORAL EVERY 6 HOURS PRN
Status: DISCONTINUED | OUTPATIENT
Start: 2018-10-23 | End: 2018-10-25 | Stop reason: HOSPADM

## 2018-10-23 RX ORDER — SIMETHICONE 80 MG
80 TABLET,CHEWABLE ORAL 4 TIMES DAILY PRN
Status: DISCONTINUED | OUTPATIENT
Start: 2018-10-23 | End: 2018-10-25 | Stop reason: HOSPADM

## 2018-10-23 RX ORDER — MORPHINE SULFATE 1 MG/ML
INJECTION, SOLUTION EPIDURAL; INTRATHECAL; INTRAVENOUS AS NEEDED
Status: DISCONTINUED | OUTPATIENT
Start: 2018-10-23 | End: 2018-10-23 | Stop reason: SURG

## 2018-10-23 RX ORDER — PROMETHAZINE HYDROCHLORIDE 12.5 MG/1
12.5 SUPPOSITORY RECTAL EVERY 6 HOURS PRN
Status: DISCONTINUED | OUTPATIENT
Start: 2018-10-23 | End: 2018-10-25 | Stop reason: HOSPADM

## 2018-10-23 RX ORDER — EPHEDRINE SULFATE 50 MG/ML
INJECTION, SOLUTION INTRAVENOUS AS NEEDED
Status: DISCONTINUED | OUTPATIENT
Start: 2018-10-23 | End: 2018-10-23 | Stop reason: SURG

## 2018-10-23 RX ORDER — ONDANSETRON 2 MG/ML
INJECTION INTRAMUSCULAR; INTRAVENOUS AS NEEDED
Status: DISCONTINUED | OUTPATIENT
Start: 2018-10-23 | End: 2018-10-23 | Stop reason: SURG

## 2018-10-23 RX ORDER — DOCUSATE SODIUM 100 MG/1
100 CAPSULE, LIQUID FILLED ORAL 2 TIMES DAILY
Status: DISCONTINUED | OUTPATIENT
Start: 2018-10-23 | End: 2018-10-25 | Stop reason: HOSPADM

## 2018-10-23 RX ORDER — OXYTOCIN/RINGER'S LACTATE 20/1000 ML
PLASTIC BAG, INJECTION (ML) INTRAVENOUS AS NEEDED
Status: DISCONTINUED | OUTPATIENT
Start: 2018-10-23 | End: 2018-10-23 | Stop reason: SURG

## 2018-10-23 RX ORDER — IBUPROFEN 800 MG/1
800 TABLET ORAL EVERY 8 HOURS SCHEDULED
Status: DISCONTINUED | OUTPATIENT
Start: 2018-10-23 | End: 2018-10-25 | Stop reason: HOSPADM

## 2018-10-23 RX ORDER — MISOPROSTOL 200 UG/1
800 TABLET ORAL AS NEEDED
Status: DISCONTINUED | OUTPATIENT
Start: 2018-10-23 | End: 2018-10-23 | Stop reason: HOSPADM

## 2018-10-23 RX ORDER — LIDOCAINE HYDROCHLORIDE 10 MG/ML
5 INJECTION, SOLUTION EPIDURAL; INFILTRATION; INTRACAUDAL; PERINEURAL AS NEEDED
Status: DISCONTINUED | OUTPATIENT
Start: 2018-10-23 | End: 2018-10-23 | Stop reason: HOSPADM

## 2018-10-23 RX ORDER — OXYTOCIN/RINGER'S LACTATE 20/1000 ML
PLASTIC BAG, INJECTION (ML) INTRAVENOUS
Status: COMPLETED
Start: 2018-10-23 | End: 2018-10-23

## 2018-10-23 RX ORDER — TRISODIUM CITRATE DIHYDRATE AND CITRIC ACID MONOHYDRATE 500; 334 MG/5ML; MG/5ML
30 SOLUTION ORAL ONCE
Status: COMPLETED | OUTPATIENT
Start: 2018-10-23 | End: 2018-10-23

## 2018-10-23 RX ORDER — NALOXONE HCL 0.4 MG/ML
0.1 VIAL (ML) INJECTION
Status: DISCONTINUED | OUTPATIENT
Start: 2018-10-23 | End: 2018-10-25 | Stop reason: HOSPADM

## 2018-10-23 RX ORDER — HYDROCODONE BITARTRATE AND ACETAMINOPHEN 5; 325 MG/1; MG/1
1 TABLET ORAL EVERY 4 HOURS PRN
Status: DISCONTINUED | OUTPATIENT
Start: 2018-10-23 | End: 2018-10-25 | Stop reason: HOSPADM

## 2018-10-23 RX ORDER — CALCIUM CARBONATE 200(500)MG
1 TABLET,CHEWABLE ORAL EVERY 6 HOURS PRN
Status: DISCONTINUED | OUTPATIENT
Start: 2018-10-23 | End: 2018-10-25 | Stop reason: HOSPADM

## 2018-10-23 RX ORDER — LIDOCAINE HYDROCHLORIDE 20 MG/ML
INJECTION, SOLUTION INFILTRATION; PERINEURAL AS NEEDED
Status: DISCONTINUED | OUTPATIENT
Start: 2018-10-23 | End: 2018-10-23 | Stop reason: SURG

## 2018-10-23 RX ORDER — PROMETHAZINE HYDROCHLORIDE 25 MG/ML
12.5 INJECTION, SOLUTION INTRAMUSCULAR; INTRAVENOUS EVERY 6 HOURS PRN
Status: DISCONTINUED | OUTPATIENT
Start: 2018-10-23 | End: 2018-10-25 | Stop reason: HOSPADM

## 2018-10-23 RX ORDER — SODIUM CHLORIDE 0.9 % (FLUSH) 0.9 %
3 SYRINGE (ML) INJECTION EVERY 12 HOURS SCHEDULED
Status: DISCONTINUED | OUTPATIENT
Start: 2018-10-23 | End: 2018-10-23 | Stop reason: HOSPADM

## 2018-10-23 RX ORDER — KETOROLAC TROMETHAMINE 30 MG/ML
INJECTION, SOLUTION INTRAMUSCULAR; INTRAVENOUS AS NEEDED
Status: DISCONTINUED | OUTPATIENT
Start: 2018-10-23 | End: 2018-10-23 | Stop reason: SURG

## 2018-10-23 RX ORDER — ONDANSETRON 4 MG/1
4 TABLET, FILM COATED ORAL EVERY 6 HOURS PRN
Status: DISCONTINUED | OUTPATIENT
Start: 2018-10-23 | End: 2018-10-25 | Stop reason: HOSPADM

## 2018-10-23 RX ORDER — BUTORPHANOL TARTRATE 1 MG/ML
2 INJECTION, SOLUTION INTRAMUSCULAR; INTRAVENOUS
Status: DISCONTINUED | OUTPATIENT
Start: 2018-10-23 | End: 2018-10-25 | Stop reason: HOSPADM

## 2018-10-23 RX ORDER — BUPIVACAINE HCL/0.9 % NACL/PF 0.1 %
2 PLASTIC BAG, INJECTION (ML) EPIDURAL ONCE
Status: COMPLETED | OUTPATIENT
Start: 2018-10-23 | End: 2018-10-23

## 2018-10-23 RX ORDER — OXYTOCIN/RINGER'S LACTATE 20/1000 ML
PLASTIC BAG, INJECTION (ML) INTRAVENOUS CONTINUOUS PRN
Status: DISCONTINUED | OUTPATIENT
Start: 2018-10-23 | End: 2018-10-23

## 2018-10-23 RX ORDER — DIPHENHYDRAMINE HCL 25 MG
25 CAPSULE ORAL EVERY 4 HOURS PRN
Status: DISCONTINUED | OUTPATIENT
Start: 2018-10-23 | End: 2018-10-23 | Stop reason: SDUPTHER

## 2018-10-23 RX ORDER — TRISODIUM CITRATE DIHYDRATE AND CITRIC ACID MONOHYDRATE 500; 334 MG/5ML; MG/5ML
30 SOLUTION ORAL ONCE
Status: DISCONTINUED | OUTPATIENT
Start: 2018-10-23 | End: 2018-10-23

## 2018-10-23 RX ORDER — BUPIVACAINE HYDROCHLORIDE 7.5 MG/ML
INJECTION, SOLUTION EPIDURAL; RETROBULBAR AS NEEDED
Status: DISCONTINUED | OUTPATIENT
Start: 2018-10-23 | End: 2018-10-23 | Stop reason: SURG

## 2018-10-23 RX ORDER — SODIUM CHLORIDE 0.9 % (FLUSH) 0.9 %
3-10 SYRINGE (ML) INJECTION AS NEEDED
Status: DISCONTINUED | OUTPATIENT
Start: 2018-10-23 | End: 2018-10-23 | Stop reason: HOSPADM

## 2018-10-23 RX ORDER — ZOLPIDEM TARTRATE 5 MG/1
5 TABLET ORAL NIGHTLY PRN
Status: DISCONTINUED | OUTPATIENT
Start: 2018-10-23 | End: 2018-10-25 | Stop reason: HOSPADM

## 2018-10-23 RX ORDER — SODIUM CHLORIDE, SODIUM LACTATE, POTASSIUM CHLORIDE, CALCIUM CHLORIDE 600; 310; 30; 20 MG/100ML; MG/100ML; MG/100ML; MG/100ML
125 INJECTION, SOLUTION INTRAVENOUS CONTINUOUS
Status: DISCONTINUED | OUTPATIENT
Start: 2018-10-23 | End: 2018-10-23

## 2018-10-23 RX ORDER — BUPIVACAINE HCL/0.9 % NACL/PF 0.1 %
2 PLASTIC BAG, INJECTION (ML) EPIDURAL EVERY 8 HOURS
Status: COMPLETED | OUTPATIENT
Start: 2018-10-23 | End: 2018-10-24

## 2018-10-23 RX ORDER — METHYLERGONOVINE MALEATE 0.2 MG/ML
200 INJECTION INTRAVENOUS ONCE AS NEEDED
Status: DISCONTINUED | OUTPATIENT
Start: 2018-10-23 | End: 2018-10-23 | Stop reason: HOSPADM

## 2018-10-23 RX ORDER — DIPHENHYDRAMINE HCL 25 MG
25 CAPSULE ORAL EVERY 4 HOURS PRN
Status: DISCONTINUED | OUTPATIENT
Start: 2018-10-23 | End: 2018-10-25 | Stop reason: HOSPADM

## 2018-10-23 RX ORDER — CELECOXIB 200 MG/1
CAPSULE ORAL
Status: COMPLETED
Start: 2018-10-23 | End: 2018-10-23

## 2018-10-23 RX ORDER — HYDROXYZINE HYDROCHLORIDE 25 MG/1
50 TABLET, FILM COATED ORAL EVERY 6 HOURS PRN
Status: DISCONTINUED | OUTPATIENT
Start: 2018-10-23 | End: 2018-10-25 | Stop reason: HOSPADM

## 2018-10-23 RX ORDER — MISOPROSTOL 200 UG/1
600 TABLET ORAL ONCE
Status: COMPLETED | OUTPATIENT
Start: 2018-10-23 | End: 2018-10-23

## 2018-10-23 RX ORDER — SODIUM CHLORIDE, SODIUM LACTATE, POTASSIUM CHLORIDE, CALCIUM CHLORIDE 600; 310; 30; 20 MG/100ML; MG/100ML; MG/100ML; MG/100ML
INJECTION, SOLUTION INTRAVENOUS
Status: COMPLETED
Start: 2018-10-23 | End: 2018-10-23

## 2018-10-23 RX ORDER — BISACODYL 5 MG/1
10 TABLET, DELAYED RELEASE ORAL DAILY PRN
Status: DISCONTINUED | OUTPATIENT
Start: 2018-10-23 | End: 2018-10-25 | Stop reason: HOSPADM

## 2018-10-23 RX ORDER — KETOROLAC TROMETHAMINE 30 MG/ML
30 INJECTION, SOLUTION INTRAMUSCULAR; INTRAVENOUS EVERY 6 HOURS
Status: DISCONTINUED | OUTPATIENT
Start: 2018-10-23 | End: 2018-10-24

## 2018-10-23 RX ORDER — METOCLOPRAMIDE 10 MG/1
10 TABLET ORAL ONCE
Status: COMPLETED | OUTPATIENT
Start: 2018-10-23 | End: 2018-10-23

## 2018-10-23 RX ORDER — PROMETHAZINE HYDROCHLORIDE 25 MG/1
25 TABLET ORAL EVERY 6 HOURS PRN
Status: DISCONTINUED | OUTPATIENT
Start: 2018-10-23 | End: 2018-10-25 | Stop reason: HOSPADM

## 2018-10-23 RX ORDER — BISACODYL 10 MG
10 SUPPOSITORY, RECTAL RECTAL DAILY PRN
Status: DISCONTINUED | OUTPATIENT
Start: 2018-10-23 | End: 2018-10-25 | Stop reason: HOSPADM

## 2018-10-23 RX ORDER — PROPOFOL 10 MG/ML
VIAL (ML) INTRAVENOUS AS NEEDED
Status: DISCONTINUED | OUTPATIENT
Start: 2018-10-23 | End: 2018-10-23 | Stop reason: SURG

## 2018-10-23 RX ADMIN — METOCLOPRAMIDE HYDROCHLORIDE 10 MG: 10 TABLET ORAL at 19:58

## 2018-10-23 RX ADMIN — SODIUM CHLORIDE, POTASSIUM CHLORIDE, SODIUM LACTATE AND CALCIUM CHLORIDE 1000 ML: 600; 310; 30; 20 INJECTION, SOLUTION INTRAVENOUS at 12:07

## 2018-10-23 RX ADMIN — PHENYLEPHRINE HYDROCHLORIDE 100 MCG: 10 INJECTION INTRAVENOUS at 12:46

## 2018-10-23 RX ADMIN — MORPHINE SULFATE 2 MG: 1 INJECTION EPIDURAL; INTRATHECAL; INTRAVENOUS at 13:09

## 2018-10-23 RX ADMIN — PHENYLEPHRINE HYDROCHLORIDE 100 MCG: 10 INJECTION INTRAVENOUS at 13:15

## 2018-10-23 RX ADMIN — MORPHINE SULFATE 5 MG: 1 INJECTION EPIDURAL; INTRATHECAL; INTRAVENOUS at 13:07

## 2018-10-23 RX ADMIN — SODIUM CHLORIDE, POTASSIUM CHLORIDE, SODIUM LACTATE AND CALCIUM CHLORIDE 100 ML: 600; 310; 30; 20 INJECTION, SOLUTION INTRAVENOUS at 13:00

## 2018-10-23 RX ADMIN — Medication 100 ML: at 13:22

## 2018-10-23 RX ADMIN — DOCUSATE SODIUM 100 MG: 100 CAPSULE, LIQUID FILLED ORAL at 19:58

## 2018-10-23 RX ADMIN — EPHEDRINE SULFATE 10 MG: 50 INJECTION INTRAVENOUS at 13:06

## 2018-10-23 RX ADMIN — ONDANSETRON 8 MG: 2 INJECTION INTRAMUSCULAR; INTRAVENOUS at 12:27

## 2018-10-23 RX ADMIN — SODIUM CHLORIDE 125 MG: 9 INJECTION, SOLUTION INTRAVENOUS at 22:04

## 2018-10-23 RX ADMIN — PHENYLEPHRINE HYDROCHLORIDE 100 MCG: 10 INJECTION INTRAVENOUS at 12:44

## 2018-10-23 RX ADMIN — SODIUM CHLORIDE, SODIUM LACTATE, POTASSIUM CHLORIDE, CALCIUM CHLORIDE 1000 ML: 600; 310; 30; 20 INJECTION, SOLUTION INTRAVENOUS at 12:07

## 2018-10-23 RX ADMIN — METHOCARBAMOL 1000 MG: 100 INJECTION INTRAMUSCULAR; INTRAVENOUS at 20:50

## 2018-10-23 RX ADMIN — PHENYLEPHRINE HYDROCHLORIDE 100 MCG: 10 INJECTION INTRAVENOUS at 12:56

## 2018-10-23 RX ADMIN — CELECOXIB 400 MG: 200 CAPSULE ORAL at 13:58

## 2018-10-23 RX ADMIN — Medication 2 G: at 12:43

## 2018-10-23 RX ADMIN — Medication 100 ML: at 13:35

## 2018-10-23 RX ADMIN — EPHEDRINE SULFATE 10 MG: 50 INJECTION INTRAVENOUS at 13:00

## 2018-10-23 RX ADMIN — Medication 100 ML: at 13:01

## 2018-10-23 RX ADMIN — BUPIVACAINE HYDROCHLORIDE 1.4 ML: 7.5 INJECTION, SOLUTION EPIDURAL; RETROBULBAR at 12:38

## 2018-10-23 RX ADMIN — KETOROLAC TROMETHAMINE 30 MG: 30 INJECTION, SOLUTION INTRAMUSCULAR; INTRAVENOUS at 19:57

## 2018-10-23 RX ADMIN — PROPOFOL 30 MG: 10 INJECTION, EMULSION INTRAVENOUS at 13:13

## 2018-10-23 RX ADMIN — EPHEDRINE SULFATE 10 MG: 50 INJECTION INTRAVENOUS at 12:56

## 2018-10-23 RX ADMIN — EPHEDRINE SULFATE 10 MG: 50 INJECTION INTRAVENOUS at 13:17

## 2018-10-23 RX ADMIN — PROPOFOL 30 MG: 10 INJECTION, EMULSION INTRAVENOUS at 13:35

## 2018-10-23 RX ADMIN — Medication 100 ML: at 13:14

## 2018-10-23 RX ADMIN — CEFAZOLIN SODIUM 2 G: 10 INJECTION, POWDER, FOR SOLUTION INTRAVENOUS at 19:58

## 2018-10-23 RX ADMIN — LIDOCAINE HYDROCHLORIDE 3 ML: 20 INJECTION, SOLUTION INFILTRATION; PERINEURAL at 12:36

## 2018-10-23 RX ADMIN — OXYTOCIN 20 UNITS: 10 INJECTION, SOLUTION INTRAMUSCULAR; INTRAVENOUS at 15:36

## 2018-10-23 RX ADMIN — PROPOFOL 30 MG: 10 INJECTION, EMULSION INTRAVENOUS at 13:30

## 2018-10-23 RX ADMIN — SODIUM CHLORIDE, POTASSIUM CHLORIDE, SODIUM LACTATE AND CALCIUM CHLORIDE 100 ML: 600; 310; 30; 20 INJECTION, SOLUTION INTRAVENOUS at 12:56

## 2018-10-23 RX ADMIN — Medication 100 ML: at 13:08

## 2018-10-23 RX ADMIN — EPHEDRINE SULFATE 10 MG: 50 INJECTION INTRAVENOUS at 13:25

## 2018-10-23 RX ADMIN — KETOROLAC TROMETHAMINE 60 MG: 30 INJECTION, SOLUTION INTRAMUSCULAR; INTRAVENOUS at 13:33

## 2018-10-23 RX ADMIN — MORPHINE SULFATE 2.8 MG: 1 INJECTION EPIDURAL; INTRATHECAL; INTRAVENOUS at 13:29

## 2018-10-23 RX ADMIN — PHENYLEPHRINE HYDROCHLORIDE 100 MCG: 10 INJECTION INTRAVENOUS at 12:53

## 2018-10-23 RX ADMIN — MISOPROSTOL 600 MCG: 200 TABLET ORAL at 19:57

## 2018-10-23 RX ADMIN — SODIUM CITRATE AND CITRIC ACID MONOHYDRATE 30 ML: 500; 334 SOLUTION ORAL at 12:00

## 2018-10-23 RX ADMIN — ONDANSETRON 4 MG: 2 INJECTION INTRAMUSCULAR; INTRAVENOUS at 18:24

## 2018-10-23 RX ADMIN — PHENYLEPHRINE HYDROCHLORIDE 100 MCG: 10 INJECTION INTRAVENOUS at 12:50

## 2018-10-23 RX ADMIN — MORPHINE SULFATE 0.2 MG: 1 INJECTION EPIDURAL; INTRATHECAL; INTRAVENOUS at 12:38

## 2018-10-23 RX ADMIN — SODIUM CHLORIDE, POTASSIUM CHLORIDE, SODIUM LACTATE AND CALCIUM CHLORIDE 700 ML: 600; 310; 30; 20 INJECTION, SOLUTION INTRAVENOUS at 12:45

## 2018-10-23 RX ADMIN — PROPOFOL 30 MG: 10 INJECTION, EMULSION INTRAVENOUS at 13:33

## 2018-10-23 NOTE — OP NOTE
Section Procedure Note    Velia Hernández    10/23/2018     Indications: 33-year-old G4 now  at 39 weeks and 4 days with 3 previous  sections desiring repeat  section and permanent surgical sterilization.    Pre-operative Diagnosis: 33-year-old G4 now  at 39 weeks and 4 days with 3 previous  sections desiring repeat  section and permanent surgical sterilization.  Microcytic anemia.    Post-operative Diagnosis: 33-year-old G4 now  at 39 weeks and 4 days with 3 previous  sections desiring repeat  section and permanent surgical sterilization.  Microcytic anemia.  Now status post repeat low transverse  section and permanent surgical sterilization    PROCEDURES PERFORMED:    SECTION REPEAT WITH TUBAL STERILIZATION WITH FILSHIE CLAMPS    SURGEON: Aroldo Haywood MD, FACOG    RESIDENT SURGEON:  Collin Lau MD, R3    ASSISTANT: Danica Cadena CSA    STAFF:   Circulator: Gretchen Harry RN  Scrub Person: Shobha Amezcua  L & BAKARI Nurse: Kacy Barros RN; Loida Wolfe RN  Assistant: Danica Cadena CSA    ANESTHESIA: Spinal    ANESTHESIA STAFF:  CRNA: Hilaria Thompson CRNA  Student Nurse Anesthetist: Christina Cowan SRNA    Findings: A 6 lbs. 14 oz. male infant named Perez Rodríguez (LISA) with Apgars 9 and 10.  Dense adhesions in the anterior abdominal wall and fascia.  Normal-appearing uterus, fallopian tubes, and ovaries.    Birth Information  YOB: 2018   Time of birth: 1:00 PM   Delivering clinician: Aroldo Haywood   Sex: male   Delivery type: , Low Transverse   Breech type (if applicable):     Observed anomalies/comments:         Estimated Blood Loss:  1000           Drains: Garcia                 Specimens: None               Complications:  None; patient tolerated the procedure well.           Disposition: Mother Baby Unit           Condition: stable    Procedure Details   After informed consent was  obtained by discussing the risks, benefits, complications, treatment options, alternatives,  and expected outcomes of the  and tubal sterilization and after the patient agreed with the proposed plan, the operative consent was signed and in order, the patient was taken to the Operating Room, identified as Velia Hernández and the procedure verified as  Delivery and permanent surgical sterilization.    After induction of anesthesia, the patient was prepped, and her panniculus was elevated with the Traxi retractor, and she was reprepped and draped in the usual sterile manner.  A Time Out was performed.      A Pfannenstiel incision was then made and carried sharply down through the subcutaneous tissue to the fascia. Fascial incision was made and extended transversely. The fascia was  from the underlying rectus tissue superiorly and inferiorly.  The rectus muscles were  in the midline. The peritoneum was entered bluntly.       A low transverse uterine incision was made. Delivered from vertex presentation was a 6 lbs. 14 oz. male infant.      After the umbilical cord was clamped and cut, cord blood was obtained for evaluation. The placenta was removed intact and appeared normal. The uterus, tubes and ovaries appeared normal. The uterine incision was closed with a running locked sutures of #1 Chromic. Hemostasis was observed.     The right fallopian tube was identified and traced out to it's fimbriated end.  A Filshie clip was placed approximately 2 cm from the cornua on the right fallopian tube. The left fallopian tube was identified and traced out to it's fimbriated end.  A Filshie clip was placed approximately 2 cm from the cornua on the left fallopian tube.     The rectus muscles were loosely approximated in the midline with #1Chromic suture. The fascia was then closed using 0 Vicryl. Further hemostasis was obtained with electrocautery.  Skin was closed with 4-0 Nylon vertical  mattress sutures.    Instrument, sponge, and needle counts were correct prior the abdominal closure and at the conclusion of the case.               This document has been electronically signed by Aroldo Haywood MD on October 23, 2018 1:29 PM

## 2018-10-23 NOTE — ANESTHESIA POSTPROCEDURE EVALUATION
Patient: Velia Hernández    Procedure Summary     Date:  10/23/18 Room / Location:  [facility] LABOR DELIVERY 1 / [facility] DELIVERY    Anesthesia Start:  1230 Anesthesia Stop:  1339    Procedure:   SECTION REPEAT WITH TUBAL (N/A Abdomen) Diagnosis:      Surgeon:  Aroldo Haywood MD Provider:  Hilaria Thompson CRNA    Anesthesia Type:  spinal ASA Status:  2          Anesthesia Type: spinal  Last vitals  BP   125/88 (10/23/18 1100)   Temp       Pulse   81 (10/23/18 1100)   Resp         SpO2         Post Anesthesia Care and Evaluation    Patient location during evaluation: bedside  Patient participation: complete - patient participated  Level of consciousness: awake and alert  Pain management: adequate  Airway patency: patent  Anesthetic complications: No anesthetic complications    Cardiovascular status: acceptable  Respiratory status: acceptable and room air  Hydration status: acceptable

## 2018-10-23 NOTE — L&D DELIVERY NOTE
Section Procedure Note     Velia Hernández     10/23/2018      Indications: 33-year-old G4 now  at 39 weeks and 4 days with 3 previous  sections desiring repeat  section and permanent surgical sterilization.    Pre-operative Diagnosis: 33-year-old G4 now  at 39 weeks and 4 days with 3 previous  sections desiring repeat  section and permanent surgical sterilization.  Microcytic anemia.     Post-operative Diagnosis: 33-year-old G4 now  at 39 weeks and 4 days with 3 previous  sections desiring repeat  section and permanent surgical sterilization.  Microcytic anemia.  Now status post repeat low transverse  section and permanent surgical sterilization     PROCEDURES PERFORMED:    SECTION REPEAT WITH TUBAL STERILIZATION WITH FILSHIE CLAMPS     SURGEON: Aroldo Haywodo MD, FACOG     RESIDENT SURGEON:  Collin Lau MD, R3     ASSISTANT: Danica Cadena CSA     STAFF:   Circulator: Gretchen Harry RN  Scrub Person: Shobha Amezcua  L & BAKARI Nurse: Kacy Barros RN; Loida Wolfe RN  Assistant: Danica Cadena CSA     ANESTHESIA: Spinal     ANESTHESIA STAFF:  CRNA: Hilaria Thompson CRNA  Student Nurse Anesthetist: Christina Cowan SRNA     Findings: A 6 lbs. 14 oz. male infant named Perez Rodríguez (LISA) with Apgars 9 and 10.  Dense adhesions in the anterior abdominal wall and fascia.  Normal-appearing uterus, fallopian tubes, and ovaries.     Birth Information  YOB: 2018   Time of birth: 1:00 PM   Delivering clinician: Aroldo Haywood   Sex: male   Delivery type: , Low Transverse   Breech type (if applicable):     Observed anomalies/comments:           Estimated Blood Loss:  1000           Drains: Garcia                 Specimens: None               Complications:  None; patient tolerated the procedure well.           Disposition: Mother Baby Unit           Condition: stable     Procedure Details   After informed  consent was obtained by discussing the risks, benefits, complications, treatment options, alternatives,  and expected outcomes of the  and tubal sterilization and after the patient agreed with the proposed plan, the operative consent was signed and in order, the patient was taken to the Operating Room, identified as Velia Hernández and the procedure verified as  Delivery and permanent surgical sterilization.     After induction of anesthesia, the patient was prepped, and her panniculus was elevated with the Traxi retractor, and she was reprepped and draped in the usual sterile manner.  A Time Out was performed.       A Pfannenstiel incision was then made and carried sharply down through the subcutaneous tissue to the fascia. Fascial incision was made and extended transversely. The fascia was  from the underlying rectus tissue superiorly and inferiorly.  The rectus muscles were  in the midline. The peritoneum was entered bluntly.        A low transverse uterine incision was made. Delivered from vertex presentation was a 6 lbs. 14 oz. male infant.       After the umbilical cord was clamped and cut, cord blood was obtained for evaluation. The placenta was removed intact and appeared normal. The uterus, tubes and ovaries appeared normal. The uterine incision was closed with a running locked sutures of #1 Chromic. Hemostasis was observed.      The right fallopian tube was identified and traced out to it's fimbriated end.  A Filshie clip was placed approximately 2 cm from the cornua on the right fallopian tube. The left fallopian tube was identified and traced out to it's fimbriated end.  A Filshie clip was placed approximately 2 cm from the cornua on the left fallopian tube.      The rectus muscles were loosely approximated in the midline with #1Chromic suture. The fascia was then closed using 0 Vicryl. Further hemostasis was obtained with electrocautery.  Skin was closed with 4-0  Nylon vertical mattress sutures.     Instrument, sponge, and needle counts were correct prior the abdominal closure and at the conclusion of the case.                    This document has been electronically signed by Aroldo Haywood MD on October 23, 2018 1:29 PM

## 2018-10-23 NOTE — H&P (VIEW-ONLY)
Obstetric History and Physical    Chief Complaint   Patient presents with   • High Risk Gestation     Velia Hernández is a 32 y.o. year old .  Patient's last menstrual period was 2018.  She presents to be seen to initiate prenatal care.  She has had 3 previous  sections.  The first in  named Jevon.   named Dorothy.  2010 named Bethanie.  Dr. Haywood delivered the last baby.     2017, she is having nausea and vomiting on prenatal vitamins, and I placed her on folic acid, Phenergan, and Zofran.  Handheld ultrasound confirms single intrauterine pregnancy with fetal heart rate 170 bpm.  After she left her prenatal labs came back showing hemoglobin 10.3 and hematocrit 31.9.  We will place her on iron.     2017, she continues to have nausea and vomiting and is requesting intermittent FMLA because she is getting points at work for having to leave 2/2 sickness. Taking phenergan at night but nothing during the daytime. Will start on zofran during the day and she will bring back her FMLA papers. Having constipation with the iron supplement and c/o a metallic taste in her mouth, especially first thing in the morning. Will send colace HS and zantac HS. Vscan showed + fetal heart rate and fetal movement. Anatomy scan ordered and scheduled for 6 weeks, LICO visit in 4 weeks.     She desires to have her tubes tied.  We'll plan for repeat  section and permanent surgical sterilization.     May 14, 2018 Here for check-up. States that she was kneed in the belly 8 days ago and has not been seen to make sure the baby is ok. Had cramping last Monday but no bleeding or cramping since then. Morning sickness has improved greatly since starting on Zofran. States that she is feeling daily movements up by her belly button, which is why she got worried after being kneed, because she thinks the baby is higher than where it should be for 15 weeks and thinks that the baby was  "actually hit. Vscan showed an active fetus that appears to be around 20 weeks in size. She reports that she had a \"normal period\" on 18, then had 2 days of light spotting on 18. Changed active LMP to 18 which makes JEVON 10/9/18 and she is 19w0d. Will confirm with anatomy scan this Friday.     18- Here for anatomy u/s and to confirm dates. She is measuring 21cm and feeling fetal movement at the umbilicus. Reports that her morning sickness is well controlled at this time and she denies dysuria, vb, LOF, headaches or heartburn. Anatomy u/s has baby measuring 17w0d with EFW of 0lb 6oz. LOIS- 13.0cm, CL- 3.58cm and posterior placenta with normal insertion of a 3 vessel cord. All fetal structures seen and noted to appear normal, except for the RVOT and LVOT; both were not adequately seen today. Recommended f/u scan in 5 weeks to complete anatomy. Baby boy, not sure of name yet.     18- F/U u/s on heart views today; all WNL and no other imaging recommended on preliminary report. Feeling regular movements and denies dysuria, vb, LOF headaches or heartburn. Having cramping after long shifts but subsides with rest.     Naming baby boy Perez Scott      CBC and one-hour Glucola today.     She has threatened  labor, and I recommend she be off work for remainder of pregnancy starting 2018.     Plan repeat low transverse  section and bilateral tubal ligation on Tuesday, 2018.  She may want Mirena IUD.     2018 one-hour Glucola normal at 134.  Hemoglobin 8.9 and hematocrit 29.2 and platelet count 362,000.  MCV 73.9.  We'll schedule patient for IV iron infusion.  I encouraged her to take her oral iron.     2018- Feeling fatigued and nauseated today. Intermittently feeling heart racing and lightheaded but not frequent or constant. Denies dysuria, vb, LOF, decreased FM or regular contractions. Has been off work since  2/2  labor. Has not scheduled " iron infusions in [place] due to confusion about frequency and length of visits. Would prefer to have them done at [facility] if possible so she can get her kids to and from school. [facility]. (Initial appt made with Dr. Curiel for 9/4 at 0900). S/S of PTL discussed. F/U in 2 weeks for LICO visit. Continue oral iron for remainder of pregnancy.      September 19, 2018 GBS negative.    The patient complains of the following: No new complaints    ROS  Headache: No   Visual changes: No   Swelling in legs: No   Nausea: No   Constipation: No   Diarrhea: No   Contractions: No   Leaking fluid: No   Vaginal bleeding: No         The following portions of the patients history were reviewed and updated as appropriate: current medications, allergies, past medical history, past surgical history, past family history, past social history and problem list .       Prenatal Information:  Prenatal Results     Initial Prenatal Labs     Test Value Reference Range Date Time    Hemoglobin 10.3 g/dL (L) 12.0 - 15.5 g/dL 03/27/18 1105    Hematocrit 31.9 % (L) 35.0 - 45.0 % 03/27/18 1105    Platelets 362 10*3/mm3 150 - 450 10*3/mm3 07/25/18 1150    Rubella IgG 53.7 IU/mL (H) 0.0 - 9.9 IU/mL 03/27/18 1105      Immune  Immune 03/27/18 1105    Hepatitis B SAg Negative  Negative 03/27/18 1105    Hepatitis C Ab Negative  Negative 03/27/18 1105    RPR Non-Reactive  Non-Reactive 03/27/18 1105    ABO B   03/27/18 1105    Rh Positive   03/27/18 1105    Antibody Screen Negative   03/27/18 1105    HIV Negative  Negative 03/27/18 1105    Urine Culture No growth at 24 hours   03/27/18 1105    Gonorrhea        Chlamydia        TSH              2nd and 3rd Trimester     Test Value Reference Range Date Time    Hemoglobin (repeated) 8.9 g/dL (L) 12.0 - 15.5 g/dL 07/25/18 1150    Hematocrit (repeated) 29.2 % (L) 35.0 - 45.0 % 07/25/18 1150     mg/dL 60 - 140 mg/dL 07/25/18 1150    Antibody Screen (repeated)         GTT Fasting        GTT 1 Hr        GTT 2 Hr        GTT 3 Hr        Group B Strep Negative  Negative 09/19/18 1142          Drug Screening     Test Value Reference Range Date Time    Amphetamine Screen Negative  Negative 03/27/18 1105    Barbiturate Screen Negative  Negative 03/27/18 1105    Benzodiazepine Screen Negative  Negative 03/27/18 1105    Methadone Screen Negative  Negative 03/27/18 1105    Phencyclidine Screen        Opiates Screen Negative  Negative 03/27/18 1105    THC Screen Negative  Negative 03/27/18 1105    Cocaine Screen Negative  Negative 03/27/18 1105    Propoxyphene Screen        Buprenorphine Screen        Methamphetamine Screen        Oxycodone Screen Negative  Negative 03/27/18 1105    Tryicyclic Antidepressants Screen              Other (Risk screening)     Test Value Reference Range Date Time    Varicella IgG        Parvovirus IgG        CMV IgG        Cystic Fibrosis        Hemoglobin electrophoresis        NIPT        MSAFP-4        AFP (for NTD only)                  External Prenatal Results     Pregnancy Outside Results - Transcribed From Office Records - See Scanned Records For Details     Test Value Date Time    Hgb 8.9 g/dL (L) 07/25/18 1150    Hct 29.2 % (L) 07/25/18 1150    ABO B  03/27/18 1105    Rh Positive  03/27/18 1105    Antibody Screen Negative  03/27/18 1105    Glucose Fasting GTT       Glucose Tolerance Test 1 hour       Glucose Tolerance Test 3 hour       Gonorrhea (discrete)       Chlamydia (discrete)       RPR Non-Reactive  03/27/18 1105    VDRL       Syphilis Antibody       Rubella 53.7 IU/mL (H) 03/27/18 1105      Immune  03/27/18 1105    HBsAg Negative  03/27/18 1105    Herpes Simplex Virus PCR       Herpes Simplex VIrus Culture       HIV Negative  03/27/18 1105    Hep C RNA Quant PCR       Hep C Antibody Negative  03/27/18 1105    AFP       Group B Strep Negative  09/19/18 1142    GBS Susceptibility to Clindamycin       GBS Susceptibility to Erythromycin        Fetal Fibronectin       Genetic Testing, Maternal Blood             Drug Screening     Test Value Date Time    Urine Drug Screen       Amphetamine Screen Negative  18 1105    Barbiturate Screen Negative  18 1105    Benzodiazepine Screen Negative  18 1105    Methadone Screen Negative  18 1105    Phencyclidine Screen       Opiates Screen Negative  18 1105    THC Screen Negative  18 1105    Cocaine Screen       Propoxyphene Screen       Buprenorphine Screen       Methamphetamine Screen       Oxycodone Screen Negative  18 1105    Tricyclic Antidepressants Screen                    Past OB History:     Obstetric History       T3      L3     SAB0   TAB0   Ectopic0   Molar0   Multiple0   Live Births3       # Outcome Date GA Lbr Dayne/2nd Weight Sex Delivery Anes PTL Lv   4 Current            3 Term 09/08/10 38w0d  2722 g (6 lb) F CS-Unspec   SUZANNE      Name: Bethanie   2 Term 06 38w0d  3175 g (7 lb) F CS-Unspec   SUZANNE      Name: Dorothy   1 Term 05 39w0d  3629 g (8 lb) M CS-Unspec   SUZANNE      Name: Brenten           ALLERGIES:   No Known Allergies     Home Medications:     Prior to Admission medications    Medication Sig Start Date End Date Taking? Authorizing Provider   docusate sodium (COLACE) 100 MG capsule Take 1 capsule by mouth Every Night. 18 Yes Tomasa Ponce APRN   ferrous sulfate 325 (65 FE) MG tablet Take 1 tablet by mouth 3 (Three) Times a Day With Meals. 18  Yes Aroldo Haywood MD   folic acid (FOLVITE) 1 MG tablet Take 1 tablet by mouth Daily. 3/27/18  Yes Aroldo Haywood MD       Past Medical History: Past Medical History:   Diagnosis Date   • Anemia of pregnancy 3/28/2018   • Maternal care due to low transverse uterine scar from previous  delivery 3/27/2018   • Obesity in pregnancy 3/27/2018      Past Surgical History Past Surgical History:   Procedure Laterality Date   •  SECTION        Family History: No family  history on file.   Social History:  has no tobacco history on file.   has no alcohol history on file.   has no drug history on file.     Review of Systems   Constitutional: Negative for activity change, appetite change, chills, diaphoresis, fatigue, fever and unexpected weight change.   Gastrointestinal: Negative for abdominal pain, constipation, diarrhea and nausea.   Genitourinary: Negative for difficulty urinating, dyspareunia, dysuria, menstrual problem, pelvic pain, urgency, vaginal bleeding, vaginal discharge and vaginal pain.   Neurological: Negative for headaches.   Psychiatric/Behavioral: Negative for dysphoric mood. The patient is not nervous/anxious.    All other systems reviewed and are negative.           Objective       Vital Signs Range for the last 24 hours  Temperature:     Temp Source:     BP: BP: (119)/(70) 119/70   Pulse:     Respirations:     SPO2:     O2 Amount (l/min):     O2 Devices     Weight: Weight:  [82.1 kg (181 lb)] 82.1 kg (181 lb)       OBGyn Exam  Physical Examination: General appearance - alert, well appearing, and in no distress and oriented to person, place, and time  Mental status - alert, oriented to person, place, and time, normal mood, behavior, speech, dress, motor activity, and thought processes  Neck - supple, no significant adenopathy  Chest - clear to auscultation, no wheezes, rales or rhonchi, symmetric air entry, no tachypnea, retractions or cyanosis  Heart - normal rate, regular rhythm, normal S1, S2, no murmurs, rubs, clicks or gallops  Abdomen - Gravid and nontender  Extremities - peripheral pulses normal, no pedal edema, no clubbing or cyanosis, no pedal edema noted    The risks, benefits. and alternatives to  section were discussed.  The risks that were discussed included, but were not limited to, pain, infection, bleeding, hemorrhage; including need for transfusion to which she would have no objection; injury to the bowel, bladder, uterus, tubes, ovaries,  or baby which could require further surgery or prolonged hospitalization.  Remote possibility of hysterectomy and/or salpingo-ophorectomy.  Remote possibility of death of baby and/or mother.  The patient understands that we'll have leg pumps on her legs to try and reduce the risk of clot formation her legs.  She understands that we will have early ambulation to also reduce the risk of blood clot formation and to reduce the risk of pneumonia.  She will be given antibiotics prior to her surgery to help decrease the risk for infection.  All questions were answered.    She was consented for PERMANENT SURGICAL STERILIZATION  We discussed the risk of no surgery to include pregnancy or undiagnosed disease process.  The risks that were discussed included, but were not limited to:  1. Bleeding with possible risk of blood transfusion. Blood products carry risk of HIV, infection, hepatitis, and transfusion reaction.  2. Infection requiring a antibiotic therapy.  3. Damage to internal organs such as bowel, bladder, blood vessels, or a hole(fistula) bladder and vagina or rectum and vagina requiring further surgical repair.  4. Anesthetic risk to include death.  5. Risk of postsurgical depression or sexual dysfunction after sterilization.  6. Risk of failure of sterilization (Approximately 1/400) and subsequent risk of ectopic pregnancy.  7. Small risk for deep vein thrombosis were all explained.   8. The small risk for reoperation in the event of unanticipated bleeding or surgical injury was discussed.          All of her questions were answered fully. She left with a very clear understanding of the preoperative surgical indications and the nature of the surgery for which she is scheduled. She understands to be NPO after midnight.         Assessment:  1. Intrauterine pregnancy at 39 weeks and 4 days on 2018 in patient with three previous  sections desiring repeat  section and permanent surgical  sterilization.    GBS status: Results in Past 30 Days  Result Component Current Result Ref Range Previous Result Ref Range   Group B Strep, DNA Negative (2018) Negative Not in Time Range        Plan:  1. Admit to labor and delivery 2018 for repeat  section and permanent surgical sterilization  2. Plan of care has been reviewed with staff, and patient.  3. Risks, benefits of treatment plan have been discussed.  4. All questions have been answered.      Aroldo Haywood MD  10/17/2018  7:03 PM

## 2018-10-23 NOTE — ANESTHESIA PREPROCEDURE EVALUATION
Anesthesia Evaluation     NPO Solid Status: > 8 hours  NPO Liquid Status: > 8 hours           Airway   Mallampati: II  TM distance: >3 FB  Neck ROM: full  No difficulty expected  Dental - normal exam     Pulmonary - normal exam   Cardiovascular - normal exam        Neuro/Psych  GI/Hepatic/Renal/Endo      Musculoskeletal     Abdominal  - normal exam   Substance History      OB/GYN          Other                        Anesthesia Plan    ASA 2     spinal     Anesthetic plan, all risks, benefits, and alternatives have been provided, discussed and informed consent has been obtained with: patient, spouse/significant other and child.

## 2018-10-23 NOTE — INTERVAL H&P NOTE
H&P reviewed. The patient was examined and there are no changes to the H&P.   Preop 9.8 hg, 31.6 hct, 298 plt, and MCV 72.3  Plan RLTCS/BTL  IV Iron  Answered all patient and family questions.  Prayed with patient.

## 2018-10-24 LAB
BASOPHILS # BLD AUTO: 0 10*3/MM3 (ref 0–0.2)
BASOPHILS NFR BLD AUTO: 0 % (ref 0–2)
DEPRECATED RDW RBC AUTO: 45.3 FL (ref 36.4–46.3)
EOSINOPHIL # BLD AUTO: 0 10*3/MM3 (ref 0–0.7)
EOSINOPHIL NFR BLD AUTO: 0 % (ref 0–7)
ERYTHROCYTE [DISTWIDTH] IN BLOOD BY AUTOMATED COUNT: 17.1 % (ref 11.5–14.5)
HCT VFR BLD AUTO: 27 % (ref 35–45)
HGB BLD-MCNC: 8.5 G/DL (ref 12–15.5)
HYPOCHROMIA BLD QL: NORMAL
IMM GRANULOCYTES # BLD: 0.04 10*3/MM3 (ref 0–0.02)
IMM GRANULOCYTES NFR BLD: 0.3 % (ref 0–0.5)
LYMPHOCYTES # BLD AUTO: 1.55 10*3/MM3 (ref 0.6–4.2)
LYMPHOCYTES NFR BLD AUTO: 9.9 % (ref 10–50)
MCH RBC QN AUTO: 22.6 PG (ref 26.5–34)
MCHC RBC AUTO-ENTMCNC: 31.5 G/DL (ref 31.4–36)
MCV RBC AUTO: 71.8 FL (ref 80–98)
MONOCYTES # BLD AUTO: 0.79 10*3/MM3 (ref 0–0.9)
MONOCYTES NFR BLD AUTO: 5.1 % (ref 0–12)
NEUTROPHILS # BLD AUTO: 13.25 10*3/MM3 (ref 2–8.6)
NEUTROPHILS NFR BLD AUTO: 84.7 % (ref 37–80)
PLATELET # BLD AUTO: 273 10*3/MM3 (ref 150–450)
PMV BLD AUTO: 12.1 FL (ref 8–12)
RBC # BLD AUTO: 3.76 10*6/MM3 (ref 3.77–5.16)
SMALL PLATELETS BLD QL SMEAR: ADEQUATE
WBC MORPH BLD: NORMAL
WBC NRBC COR # BLD: 15.63 10*3/MM3 (ref 3.2–9.8)

## 2018-10-24 PROCEDURE — 85007 BL SMEAR W/DIFF WBC COUNT: CPT | Performed by: OBSTETRICS & GYNECOLOGY

## 2018-10-24 PROCEDURE — 85025 COMPLETE CBC W/AUTO DIFF WBC: CPT | Performed by: OBSTETRICS & GYNECOLOGY

## 2018-10-24 PROCEDURE — 25010000002 CEFAZOLIN PER 500 MG: Performed by: OBSTETRICS & GYNECOLOGY

## 2018-10-24 PROCEDURE — 25010000002 METHOCARBAMOL 1000 MG/10ML SOLUTION 10 ML VIAL: Performed by: OBSTETRICS & GYNECOLOGY

## 2018-10-24 PROCEDURE — 25010000002 KETOROLAC TROMETHAMINE PER 15 MG: Performed by: OBSTETRICS & GYNECOLOGY

## 2018-10-24 RX ADMIN — DOCUSATE SODIUM 100 MG: 100 CAPSULE, LIQUID FILLED ORAL at 21:09

## 2018-10-24 RX ADMIN — METHOCARBAMOL 1000 MG: 100 INJECTION INTRAMUSCULAR; INTRAVENOUS at 01:21

## 2018-10-24 RX ADMIN — IBUPROFEN 800 MG: 800 TABLET ORAL at 05:48

## 2018-10-24 RX ADMIN — HYDROCODONE BITARTRATE AND ACETAMINOPHEN 1 TABLET: 5; 325 TABLET ORAL at 21:09

## 2018-10-24 RX ADMIN — CEFAZOLIN SODIUM 2 G: 10 INJECTION, POWDER, FOR SOLUTION INTRAVENOUS at 03:56

## 2018-10-24 RX ADMIN — KETOROLAC TROMETHAMINE 30 MG: 30 INJECTION, SOLUTION INTRAMUSCULAR; INTRAVENOUS at 01:20

## 2018-10-24 RX ADMIN — IBUPROFEN 800 MG: 800 TABLET ORAL at 21:09

## 2018-10-24 RX ADMIN — POLYETHYLENE GLYCOL 3350 17 G: 17 POWDER, FOR SOLUTION ORAL at 08:34

## 2018-10-24 RX ADMIN — DOCUSATE SODIUM 100 MG: 100 CAPSULE, LIQUID FILLED ORAL at 08:34

## 2018-10-24 RX ADMIN — IBUPROFEN 800 MG: 800 TABLET ORAL at 13:42

## 2018-10-24 RX ADMIN — HYDROCODONE BITARTRATE AND ACETAMINOPHEN 1 TABLET: 5; 325 TABLET ORAL at 13:42

## 2018-10-24 NOTE — PLAN OF CARE
Problem: Patient Care Overview  Goal: Plan of Care Review  Outcome: Ongoing (interventions implemented as appropriate)   10/24/18 1290   Coping/Psychosocial   Plan of Care Reviewed With patient   Plan of Care Review   Progress improving   OTHER   Outcome Summary vss, voiding, passing flatus, showered, ambulating in room     Goal: Individualization and Mutuality  Outcome: Ongoing (interventions implemented as appropriate)    Goal: Interprofessional Rounds/Family Conf  Outcome: Ongoing (interventions implemented as appropriate)      Problem: Breastfeeding (Adult,Obstetrics,Pediatric)  Goal: Signs and Symptoms of Listed Potential Problems Will be Absent, Minimized or Managed (Breastfeeding)  Outcome: Ongoing (interventions implemented as appropriate)

## 2018-10-24 NOTE — PLAN OF CARE
Problem: Patient Care Overview  Goal: Plan of Care Review  Outcome: Ongoing (interventions implemented as appropriate)   10/24/18 0407   Coping/Psychosocial   Plan of Care Reviewed With patient   Plan of Care Review   Progress improving     Goal: Individualization and Mutuality  Outcome: Ongoing (interventions implemented as appropriate)    Goal: Discharge Needs Assessment  Outcome: Ongoing (interventions implemented as appropriate)    Goal: Interprofessional Rounds/Family Conf  Outcome: Ongoing (interventions implemented as appropriate)      Problem: Postpartum ( Delivery) (Adult,Obstetrics,Pediatric)  Goal: Signs and Symptoms of Listed Potential Problems Will be Absent, Minimized or Managed (Postpartum)  Outcome: Ongoing (interventions implemented as appropriate)    Goal: Anesthesia/Sedation Recovery  Outcome: Ongoing (interventions implemented as appropriate)      Problem: Fall Risk,  (Adult,Obstetrics,Pediatric)  Goal: Identify Related Risk Factors and Signs and Symptoms  Outcome: Ongoing (interventions implemented as appropriate)    Goal: Absence of Maternal Fall  Outcome: Ongoing (interventions implemented as appropriate)    Goal: Absence of [place] Fall/Drop  Outcome: Ongoing (interventions implemented as appropriate)      Problem: Breastfeeding (Adult,Obstetrics,Pediatric)  Goal: Signs and Symptoms of Listed Potential Problems Will be Absent, Minimized or Managed (Breastfeeding)  Outcome: Ongoing (interventions implemented as appropriate)

## 2018-10-24 NOTE — PROGRESS NOTES
[facility]  [place]  : 1985  MRN: [id number]  CSN: [id number]    Post-operative Day #1  Subjective   Her pain is well controlled. She is ambulating and voiding without issues. She is tolerating a regular diet and is passing flatus. Breastfeeding without issues. No acute issues or concerns this morning.      Objective     Min/max vitals past 24 hours:   Temp  Min: 97.1 °F (36.2 °C)  Max: 99.3 °F (37.4 °C)  BP  Min: 121/56  Max: 182/72  Pulse  Min: 60  Max: 100  Pulse  Min: 60  Max: 100        General: well developed; well nourished  no acute distress   Abdomen: Soft, appropriately tender to palpation. Uterus firm at umbilicus + 1. Incision clean and well approximated.    Pelvic: Not performed   Ext: Calves NT     Lab Results   Component Value Date    WBC 15.63 (H) 10/24/2018    HGB 8.5 (L) 10/24/2018    HCT 27.0 (L) 10/24/2018    MCV 71.8 (L) 10/24/2018     10/24/2018    RH Positive 10/23/2018    HEPBSAG Negative 2018        Assessment   1. Patient is a 33 year old G4 now  status post repeat low transverse  section and bilateral tubal ligation. Post operative day #1- patient is meeting all early milestones. Hemoglobin drop appropriate for estimated blood loss- hemoglobin 8.5 from 9.8 preoperatively. No signs or symptoms of infection or hemodynamic instability- vital signs are within normal limits and examination is benign this morning.     Plan   1. Continue routine postpartum care  2. Ambulate   3. Anticipate discharge tomorrow  4. Regular diet  5. SCDs            This document has been electronically signed by Sid Polanco MD on 2018 6:20 AM

## 2018-10-25 VITALS
DIASTOLIC BLOOD PRESSURE: 63 MMHG | RESPIRATION RATE: 18 BRPM | TEMPERATURE: 98.6 F | HEART RATE: 70 BPM | OXYGEN SATURATION: 98 % | SYSTOLIC BLOOD PRESSURE: 143 MMHG

## 2018-10-25 RX ORDER — BISACODYL 5 MG/1
10 TABLET, DELAYED RELEASE ORAL DAILY PRN
Qty: 30 TABLET | Refills: 2 | Status: SHIPPED | OUTPATIENT
Start: 2018-10-25 | End: 2018-12-10 | Stop reason: SDUPTHER

## 2018-10-25 RX ORDER — HYDROCODONE BITARTRATE AND ACETAMINOPHEN 5; 325 MG/1; MG/1
1 TABLET ORAL EVERY 4 HOURS PRN
Qty: 30 TABLET | Refills: 0 | Status: SHIPPED | OUTPATIENT
Start: 2018-10-25 | End: 2018-12-10

## 2018-10-25 RX ORDER — IBUPROFEN 800 MG/1
800 TABLET ORAL EVERY 8 HOURS
Qty: 60 TABLET | Refills: 12 | Status: SHIPPED | OUTPATIENT
Start: 2018-10-25

## 2018-10-25 RX ADMIN — HYDROCODONE BITARTRATE AND ACETAMINOPHEN 1 TABLET: 5; 325 TABLET ORAL at 03:53

## 2018-10-25 RX ADMIN — HYDROCODONE BITARTRATE AND ACETAMINOPHEN 1 TABLET: 5; 325 TABLET ORAL at 11:21

## 2018-10-25 RX ADMIN — IBUPROFEN 800 MG: 800 TABLET ORAL at 05:44

## 2018-10-25 RX ADMIN — POLYETHYLENE GLYCOL 3350 17 G: 17 POWDER, FOR SOLUTION ORAL at 08:25

## 2018-10-25 NOTE — PLAN OF CARE
Problem: Patient Care Overview  Goal: Plan of Care Review  Outcome: Ongoing (interventions implemented as appropriate)   10/24/18 1748 10/25/18 0400   Coping/Psychosocial   Plan of Care Reviewed With --  patient   Plan of Care Review   Progress --  improving   OTHER   Outcome Summary vss, voiding, passing flatus, showered, ambulating in room --      Goal: Individualization and Mutuality  Outcome: Ongoing (interventions implemented as appropriate)    Goal: Discharge Needs Assessment  Outcome: Ongoing (interventions implemented as appropriate)    Goal: Interprofessional Rounds/Family Conf  Outcome: Ongoing (interventions implemented as appropriate)      Problem: Postpartum ( Delivery) (Adult,Obstetrics,Pediatric)  Goal: Signs and Symptoms of Listed Potential Problems Will be Absent, Minimized or Managed (Postpartum)  Outcome: Ongoing (interventions implemented as appropriate)      Problem: Breastfeeding (Adult,Obstetrics,Pediatric)  Goal: Signs and Symptoms of Listed Potential Problems Will be Absent, Minimized or Managed (Breastfeeding)  Outcome: Ongoing (interventions implemented as appropriate)

## 2018-10-25 NOTE — PROGRESS NOTES
[facility]  Velia [place]  : 1985  MRN: [id number]  CSN: [id number]    Post-operative Day #2  Subjective   Her pain is well controlled.  Vaginal bleeding is essentially absent.  She is passing gas and has had a bowel movement. Pt tolerating hospital food without nausea or vomiting. Pt ambulating to unit doors with family. Pt denies fever, chills or dysuria. Pt is breast feeding and wishes to return home.      Objective     Min/max vitals past 24 hours:   Temp  Min: 97.2 °F (36.2 °C)  Max: 99.2 °F (37.3 °C)  BP  Min: 113/61  Max: 138/80  Pulse  Min: 72  Max: 89  Pulse  Min: 72  Max: 89        General: well developed; well nourished  no acute distress   Abdomen: soft, non-tender; no masses  no umbilical or inginual hernias are present  no hepato-splenomegaly  incision is open, clean, dry, intact and without drainage  fundus firm and non-tender @  + 1 cm midline  Normal findings: soft, non-tender, no organomegaly, umbilicus normal and symmetric   Pelvic: Not performed   Ext: Calves NT     Lab Results   Component Value Date    WBC 15.63 (H) 10/24/2018    HGB 8.5 (L) 10/24/2018    HCT 27.0 (L) 10/24/2018    MCV 71.8 (L) 10/24/2018     10/24/2018    RH Positive 10/23/2018    HEPBSAG Negative 2018        Assessment   1. 34 yo  POD #2 S/P Repeat LTCS with bilateral tubal ligation     Plan   1. Discharge to home  2. Advised no tampons or intercourse for 6 weeks.  3. D/C questions all answered  4. Follow-up appointment in 2 week(s)          This document has been electronically signed by Alfa Marinelli III, MD on 2018 5:58 AM

## 2018-10-25 NOTE — DISCHARGE SUMMARY
[facility]  Velia [place]  : 1985  MRN: [id number]  CSN: [id number]    Discharge Summary      Date of Admission: 10/23/2018   Date of Discharge:  10-25-18   Principle Discharge Dx: 1. Repeat low Caesarean Section with bilateral tubal ligation production of viable male infant   Procedures Performed: Procedure(s):   SECTION REPEAT WITH TUBAL   Brief History: Patient is a 33 y.o. now  who presented to labor and delivery for scheduled Caesarean Section with bilateral tubal ligation..   Hospital Course: Her post-operative course was unremarkable.  On POD # 2 she expressed the desire for D/C. She had no febrile morbidity. She had passed gas, and was urinating normally. She was eating a regular diet without difficulty. She was ambulating well. Her incision was clean, dry and intact. Discharge instructions were given.  All questions were answered   Pending Studies:   Diet:  Discharge Condition: None  Regular  Good   Discharge Activity: Vaginal rest for 6 weeks.  Other activity as tolerated.   Discharge Medications:    Your medication list      START taking these medications      Instructions Last Dose Given Next Dose Due   HYDROcodone-acetaminophen 5-325 MG per tablet  Commonly known as:  NORCO      Take 1 tablet by mouth Every 4 (Four) Hours As Needed for Severe Pain .       ibuprofen 800 MG tablet  Commonly known as:  ADVIL,MOTRIN      Take 1 tablet by mouth Every 8 (Eight) Hours.          CONTINUE taking these medications      Instructions Last Dose Given Next Dose Due   docusate sodium 100 MG capsule  Commonly known as:  COLACE      Take 1 capsule by mouth Every Night.       ferrous sulfate 325 (65 FE) MG tablet      Take 1 tablet by mouth 3 (Three) Times a Day With Meals.       folic acid 1 MG tablet  Commonly known as:  FOLVITE      Take 1 tablet by mouth Daily.             Where to Get Your Medications      You can get these medications from any pharmacy    Bring a paper prescription  for each of these medications  · HYDROcodone-acetaminophen 5-325 MG per tablet  · ibuprofen 800 MG tablet        Discharge Disposition: home     Follow-up: Future Appointments  Date Time Provider Department Center   10/31/2018 1:30 PM Aroldo Haywood MD [facility] None          This note has been electronically signed.        This document has been electronically signed by Alfa Marinelli III, MD on October 25, 2018 6:01 AM

## 2018-11-05 ENCOUNTER — OFFICE VISIT (OUTPATIENT)
Dept: OBSTETRICS AND GYNECOLOGY | Facility: CLINIC | Age: 33
End: 2018-11-05

## 2018-11-05 VITALS
DIASTOLIC BLOOD PRESSURE: 81 MMHG | SYSTOLIC BLOOD PRESSURE: 122 MMHG | WEIGHT: 160 LBS | BODY MASS INDEX: 29.44 KG/M2 | HEIGHT: 62 IN | HEART RATE: 103 BPM

## 2018-11-05 DIAGNOSIS — Z48.02 VISIT FOR SUTURE REMOVAL: Primary | ICD-10-CM

## 2018-11-05 PROCEDURE — 0503F POSTPARTUM CARE VISIT: CPT | Performed by: OBSTETRICS & GYNECOLOGY

## 2018-11-05 NOTE — PROGRESS NOTES
Subjective   Chief Complaint   Patient presents with   • Post-op     Velia Hernández is a 33 y.o. year old  presenting for a postpartum visit.  She had a Repeat  (LTCS) with BTL 2018 at 39 weeks and 4 days with 3 previous  sections delivering a 6 lbs. 14 oz. male infant named Perez Rodríguez (LISA).      Obstetric History  #: 1, Date: 05, Sex: Male, Weight: 3629 g (8 lb), GA: 39w0d, Delivery:     #: 2, Date: 06, Sex: Female, Weight: 3175 g (7 lb), GA: 38w0d, Delivery:     #: 3, Date: 09/08/10, Sex: Female, Weight: 2722 g (6 lb), GA: 38w0d, Delivery:     #: 4, Date: 10/23/18, Sex: Male, Weight: 3110 g (6 lb 13.7 oz), GA: 39w4d, Delivery:       Since delivery she has not been sexually active.  She does not have concerns about post-partum blues/depression.   She is breast feeding      Outpatient Medications Prior to Visit   Medication Sig Dispense Refill   • bisacodyl (DULCOLAX) 5 MG EC tablet Take 2 tablets by mouth Daily As Needed for Constipation. 30 tablet 2   • docusate sodium (COLACE) 100 MG capsule Take 1 capsule by mouth Every Night. 30 capsule 12   • ferrous sulfate 325 (65 FE) MG tablet Take 1 tablet by mouth 3 (Three) Times a Day With Meals. 90 tablet 12   • folic acid (FOLVITE) 1 MG tablet Take 1 tablet by mouth Daily. 90 tablet 3   • HYDROcodone-acetaminophen (NORCO) 5-325 MG per tablet Take 1 tablet by mouth Every 4 (Four) Hours As Needed for Severe Pain. 30 tablet 0   • ibuprofen (ADVIL,MOTRIN) 800 MG tablet Take 1 tablet by mouth Every 8 (Eight) Hours **Take with food** 60 tablet 12     No facility-administered medications prior to visit.        The following portions of the patient's history were reviewed and updated as appropriate:  problem list, current medications, allergies, past family history, past medical history, past social history and past surgical history    Smoking status: Not on file                         "Smokeless tobacco: Not on file                       Review of Systems   Constitutional: Negative for activity change, appetite change, chills, diaphoresis, fatigue, fever and unexpected weight change.   Gastrointestinal: Negative for abdominal pain, constipation, diarrhea and nausea.   Genitourinary: Negative for difficulty urinating, dysuria, menstrual problem, pelvic pain, urgency, vaginal bleeding, vaginal discharge and vaginal pain.   Neurological: Negative for headaches.   Psychiatric/Behavioral: Negative for dysphoric mood. The patient is not nervous/anxious.    All other systems reviewed and are negative.        Objective   /81   Pulse 103   Ht 157.5 cm (62\")   Wt 72.6 kg (160 lb)   LMP 01/23/2018 (Approximate)   BMI 29.26 kg/m²     General:  well developed; well nourished  no acute distress   Abdomen: incision is clean, dry, intact, without drainage and Sutures were removed   Pelvis: Not performed.        :  Problems Addressed this Visit     None      Visit Diagnoses     Visit for suture removal    -  Primary    Encounter for postpartum visit                   Plan   1. Follow-up in 3 months for annual exam and Pap smear.  Follow-up sooner as needed.         This note was electronically signed.    Aroldo Haywood MD  November 5, 2018  "

## 2018-12-10 ENCOUNTER — OFFICE VISIT (OUTPATIENT)
Dept: OBSTETRICS AND GYNECOLOGY | Facility: CLINIC | Age: 33
End: 2018-12-10

## 2018-12-10 VITALS
BODY MASS INDEX: 29.81 KG/M2 | HEART RATE: 63 BPM | WEIGHT: 162 LBS | SYSTOLIC BLOOD PRESSURE: 118 MMHG | DIASTOLIC BLOOD PRESSURE: 70 MMHG | HEIGHT: 62 IN

## 2018-12-10 DIAGNOSIS — K59.04 CHRONIC IDIOPATHIC CONSTIPATION: ICD-10-CM

## 2018-12-10 DIAGNOSIS — D50.0 IRON DEFICIENCY ANEMIA DUE TO CHRONIC BLOOD LOSS: ICD-10-CM

## 2018-12-10 DIAGNOSIS — E66.3 OVERWEIGHT (BMI 25.0-29.9): ICD-10-CM

## 2018-12-10 PROBLEM — O21.9 NAUSEA AND VOMITING DURING PREGNANCY PRIOR TO 22 WEEKS GESTATION: Status: RESOLVED | Noted: 2018-04-30 | Resolved: 2018-12-10

## 2018-12-10 PROBLEM — O99.612 CONSTIPATION DURING PREGNANCY IN SECOND TRIMESTER: Status: RESOLVED | Noted: 2018-04-30 | Resolved: 2018-12-10

## 2018-12-10 PROBLEM — O47.02 THREATENED PREMATURE LABOR IN SECOND TRIMESTER: Status: RESOLVED | Noted: 2018-07-28 | Resolved: 2018-12-10

## 2018-12-10 PROBLEM — O34.211 MATERNAL CARE DUE TO LOW TRANSVERSE UTERINE SCAR FROM PREVIOUS CESAREAN DELIVERY: Status: RESOLVED | Noted: 2018-03-27 | Resolved: 2018-12-10

## 2018-12-10 PROBLEM — K59.00 CONSTIPATION DURING PREGNANCY IN SECOND TRIMESTER: Status: RESOLVED | Noted: 2018-04-30 | Resolved: 2018-12-10

## 2018-12-10 PROBLEM — O99.210 OBESITY IN PREGNANCY: Status: RESOLVED | Noted: 2018-03-27 | Resolved: 2018-12-10

## 2018-12-10 PROCEDURE — 0503F POSTPARTUM CARE VISIT: CPT | Performed by: OBSTETRICS & GYNECOLOGY

## 2018-12-10 RX ORDER — BISACODYL 5 MG/1
10 TABLET, DELAYED RELEASE ORAL DAILY PRN
Qty: 30 TABLET | Refills: 2 | Status: SHIPPED | OUTPATIENT
Start: 2018-12-10

## 2018-12-10 NOTE — PROGRESS NOTES
Subjective   Chief Complaint   Patient presents with   • Post-op     Velia Hernández is a 33 y.o. year old  presenting for a second postpartum visit.  She had a Repeat  (LTCS) with BTL 2018 at 39 weeks and 4 days with 3 previous  sections delivering a 6 lbs. 14 oz. male infant named Perez Rodríguez (LISA).      Obstetric History  #: 1, Date: 05, Sex: Male, Weight: 3629 g (8 lb), GA: 39w0d, Delivery:     #: 2, Date: 06, Sex: Female, Weight: 3175 g (7 lb), GA: 38w0d, Delivery:     #: 3, Date: 09/08/10, Sex: Female, Weight: 2722 g (6 lb), GA: 38w0d, Delivery:     #: 4, Date: 10/23/18, Sex: Male, Weight: 3110 g (6 lb 13.7 oz), GA: 39w4d, Delivery:       Since delivery she has not been sexually active.  She does not have concerns about post-partum blues/depression.   She is breast feeding.    She still has problems with constipation and gas pains.  We discussed what to do for this.  She had 1 remaining stitch that was removed today.      Outpatient Medications Prior to Visit   Medication Sig Dispense Refill   • docusate sodium (COLACE) 100 MG capsule Take 1 capsule by mouth Every Night. 30 capsule 12   • ferrous sulfate 325 (65 FE) MG tablet Take 1 tablet by mouth 3 (Three) Times a Day With Meals. 90 tablet 12   • folic acid (FOLVITE) 1 MG tablet Take 1 tablet by mouth Daily. 90 tablet 3   • ibuprofen (ADVIL,MOTRIN) 800 MG tablet Take 1 tablet by mouth Every 8 (Eight) Hours **Take with food** 60 tablet 12   • bisacodyl (DULCOLAX) 5 MG EC tablet Take 2 tablets by mouth Daily As Needed for Constipation. 30 tablet 2   • HYDROcodone-acetaminophen (NORCO) 5-325 MG per tablet Take 1 tablet by mouth Every 4 (Four) Hours As Needed for Severe Pain. 30 tablet 0     No facility-administered medications prior to visit.        The following portions of the patient's history were reviewed and updated as appropriate:  problem list, current medications,  "allergies, past family history, past medical history, past social history and past surgical history    Social History    Tobacco Use      Smoking status: Not on file    Review of Systems   Constitutional: Negative for activity change, appetite change, chills, diaphoresis, fatigue, fever and unexpected weight change.   Gastrointestinal: Positive for constipation. Negative for abdominal pain, diarrhea and nausea.   Genitourinary: Negative for difficulty urinating, dysuria, menstrual problem, pelvic pain, urgency, vaginal bleeding, vaginal discharge and vaginal pain.   Neurological: Negative for headaches.   Psychiatric/Behavioral: Negative for dysphoric mood. The patient is not nervous/anxious.    All other systems reviewed and are negative.        Objective   /70   Pulse 63   Ht 157.5 cm (62\")   Wt 73.5 kg (162 lb)   BMI 29.63 kg/m²     General:  well developed; well nourished  no acute distress   Abdomen: incision is clean, dry, intact, without drainage and one suture was removed   Pelvis: Not performed.        :  Problems Addressed this Visit        Hematopoietic and Hemostatic    Iron deficiency anemia due to chronic blood loss       Other    Overweight (BMI 25.0-29.9)      Other Visit Diagnoses     Encounter for postpartum visit    -  Primary    Chronic idiopathic constipation                   Plan   1. Follow-up in 6 months for annual exam and Pap smear.  Follow-up sooner as needed.         This note was electronically signed.    Aroldo Haywood MD  December 10, 2018  "

## (undated) DEVICE — SUT VIC PLS 0 CTX 36IN UD VCP978H

## (undated) DEVICE — TRY SPINE PENCAN 24GA X4IN

## (undated) DEVICE — SUT VIC 0 CT1 36IN J946H

## (undated) DEVICE — SUT ETHLN 4/0 30IN 626H

## (undated) DEVICE — SUT VIC 2/0 CT1 27IN J259H

## (undated) DEVICE — Device

## (undated) DEVICE — GARMENT,MEDLINE,DVT,INT,CALF,MED, GEN2: Brand: MEDLINE

## (undated) DEVICE — PK C/SECT 60

## (undated) DEVICE — GLV SURG MICROTOUCH LTX SZ7 STRL

## (undated) DEVICE — GLV SURG SENSICARE GREEN W/ALOE PF LF 6.5 STRL

## (undated) DEVICE — SUT GUT CHRM 1 CTX 36IN 905H